# Patient Record
Sex: FEMALE | Race: WHITE | NOT HISPANIC OR LATINO | Employment: FULL TIME | URBAN - METROPOLITAN AREA
[De-identification: names, ages, dates, MRNs, and addresses within clinical notes are randomized per-mention and may not be internally consistent; named-entity substitution may affect disease eponyms.]

---

## 2023-05-23 ENCOUNTER — OFFICE VISIT (OUTPATIENT)
Dept: PRIMARY CARE | Facility: CLINIC | Age: 63
End: 2023-05-23
Payer: COMMERCIAL

## 2023-05-23 VITALS
SYSTOLIC BLOOD PRESSURE: 120 MMHG | WEIGHT: 145 LBS | OXYGEN SATURATION: 99 % | HEART RATE: 73 BPM | TEMPERATURE: 97.2 F | DIASTOLIC BLOOD PRESSURE: 86 MMHG | BODY MASS INDEX: 24.57 KG/M2

## 2023-05-23 DIAGNOSIS — M79.674 GREAT TOE PAIN, RIGHT: Primary | ICD-10-CM

## 2023-05-23 PROCEDURE — 99213 OFFICE O/P EST LOW 20 MIN: CPT | Performed by: FAMILY MEDICINE

## 2023-05-23 PROCEDURE — 1036F TOBACCO NON-USER: CPT | Performed by: FAMILY MEDICINE

## 2023-05-23 RX ORDER — IBUPROFEN 800 MG/1
800 TABLET ORAL 3 TIMES DAILY
Qty: 21 TABLET | Refills: 0 | Status: SHIPPED | OUTPATIENT
Start: 2023-05-23 | End: 2023-07-22

## 2023-05-23 RX ORDER — OMEPRAZOLE 40 MG/1
CAPSULE, DELAYED RELEASE ORAL
COMMUNITY
Start: 2017-09-18 | End: 2023-06-20 | Stop reason: SDUPTHER

## 2023-05-23 RX ORDER — ESTRADIOL 10 UG/1
INSERT VAGINAL
COMMUNITY
Start: 2022-06-03 | End: 2023-06-20 | Stop reason: SDUPTHER

## 2023-05-23 RX ORDER — BUPROPION HCL 300 MG
TABLET, EXTENDED RELEASE 24 HR ORAL
COMMUNITY
Start: 2016-12-13 | End: 2023-06-20 | Stop reason: SDUPTHER

## 2023-05-23 RX ORDER — UMECLIDINIUM 62.5 UG/1
AEROSOL, POWDER ORAL
COMMUNITY
Start: 2022-12-21 | End: 2023-06-20 | Stop reason: SDUPTHER

## 2023-05-23 RX ORDER — FERROUS SULFATE 325(65) MG
TABLET ORAL
COMMUNITY

## 2023-05-23 RX ORDER — TRAZODONE HYDROCHLORIDE 50 MG/1
TABLET ORAL
COMMUNITY
Start: 2013-01-15 | End: 2023-06-20 | Stop reason: SDUPTHER

## 2023-05-23 RX ORDER — ALBUTEROL SULFATE 90 UG/1
AEROSOL, METERED RESPIRATORY (INHALATION)
COMMUNITY
Start: 2021-08-04

## 2023-05-23 NOTE — PROGRESS NOTES
Subjective   Patient ID: Cami Jamison is a 62 y.o. female who presents for swelling/pain in right foot, nki (X months).  HPI    Patient presents because she has been having pain in her right first toe weaning down to the back of her foot for a number of months now.  Worse when she walks around.  Sometimes gets swollen but at times it hurts without being swollen.  No fall or trauma that she is aware of.  Has not tried anti-inflammatories.  Tylenol helps sometimes.    Current Outpatient Medications on File Prior to Visit   Medication Sig Dispense Refill    albuterol 90 mcg/actuation inhaler Inhale.      estradiol (Vagifem) 10 mcg tablet vaginal tablet Insert into the vagina.      ferrous sulfate 325 (65 Fe) MG tablet Take by mouth.      Incruse Ellipta 62.5 mcg/actuation inhalation Inhale.      omeprazole (PriLOSEC) 40 mg DR capsule Take by mouth.      traZODone (Desyrel) 50 mg tablet Take by mouth.      Wellbutrin  mg 24 hr tablet Take by mouth.       No current facility-administered medications on file prior to visit.        Vitals:    05/23/23 1401   BP: 120/86   Pulse: 73   Temp: 36.2 °C (97.2 °F)   SpO2: 99%       Review of Systems   All other systems reviewed and are negative.      Objective     Physical Exam  Musculoskeletal:      Comments: Tenderness to palpation at base of R toe on plantar surface         No visits with results within 2 Month(s) from this visit.   Latest known visit with results is:   Legacy Encounter on 10/12/2022   Component Date Value Ref Range Status    Glucose 10/12/2022 84  74 - 99 mg/dL Final    Sodium 10/12/2022 142  136 - 145 mmol/L Final    Potassium 10/12/2022 4.0  3.5 - 5.3 mmol/L Final    Chloride 10/12/2022 102  98 - 107 mmol/L Final    Bicarbonate 10/12/2022 32  21 - 32 mmol/L Final    Anion Gap 10/12/2022 12  10 - 20 mmol/L Final    Urea Nitrogen 10/12/2022 15  6 - 23 mg/dL Final    Creatinine 10/12/2022 0.84  0.50 - 1.05 mg/dL Final    GFR Female 10/12/2022 78   >90 mL/min/1.73m2 Final    Comment:  CALCULATIONS OF ESTIMATED GFR ARE PERFORMED   USING THE 2021 CKD-EPI STUDY REFIT EQUATION   WITHOUT THE RACE VARIABLE FOR THE IDMS-TRACEABLE   CREATININE METHODS.    https://jasn.asnjournals.org/content/early/2021/09/22/ASN.7684562481      Calcium 10/12/2022 9.6  8.6 - 10.3 mg/dL Final    Albumin 10/12/2022 4.5  3.4 - 5.0 g/dL Final    Alkaline Phosphatase 10/12/2022 48  33 - 136 U/L Final    Total Protein 10/12/2022 6.4  6.4 - 8.2 g/dL Final    AST 10/12/2022 16  9 - 39 U/L Final    Total Bilirubin 10/12/2022 0.4  0.0 - 1.2 mg/dL Final    ALT (SGPT) 10/12/2022 16  7 - 45 U/L Final    Comment:  Patients treated with Sulfasalazine may generate    falsely decreased results for ALT.      Lipase 10/12/2022 26  9 - 82 U/L Final    Comment:  Venipuncture immediately after or during the    administration of Metamizole may lead to falsely   low results. Testing should be performed immediately   prior to Metamizole dosing.      WBC 10/12/2022 7.2  4.4 - 11.3 x10E9/L Final    RBC 10/12/2022 3.87 (L)  4.00 - 5.20 x10E12/L Final    Hemoglobin 10/12/2022 12.4  12.0 - 16.0 g/dL Final    Hematocrit 10/12/2022 37.5  36.0 - 46.0 % Final    MCV 10/12/2022 97  80 - 100 fL Final    MCHC 10/12/2022 33.1  32.0 - 36.0 g/dL Final    Platelets 10/12/2022 263  150 - 450 x10E9/L Final    RDW 10/12/2022 12.5  11.5 - 14.5 % Final    Neutrophils % 10/12/2022 48.4  40.0 - 80.0 % Final    Immature Granulocytes %, Automated 10/12/2022 0.1  0.0 - 0.9 % Final    Comment:  Immature Granulocyte Count (IG) includes promyelocytes,    myelocytes and metamyelocytes but does not include bands.   Percent differential counts (%) should be interpreted in the   context of the absolute cell counts (cells/L).      Lymphocytes % 10/12/2022 36.3  13.0 - 44.0 % Final    Monocytes % 10/12/2022 9.9  2.0 - 10.0 % Final    Eosinophils % 10/12/2022 4.6  0.0 - 6.0 % Final    Basophils % 10/12/2022 0.7  0.0 - 2.0 % Final    Neutrophils  Absolute 10/12/2022 3.46  1.20 - 7.70 x10E9/L Final    Lymphocytes Absolute 10/12/2022 2.60  1.20 - 4.80 x10E9/L Final    Monocytes Absolute 10/12/2022 0.71  0.10 - 1.00 x10E9/L Final    Eosinophils Absolute 10/12/2022 0.33  0.00 - 0.70 x10E9/L Final    Basophils Absolute 10/12/2022 0.05  0.00 - 0.10 x10E9/L Final       Assessment/Plan   Problem List Items Addressed This Visit    None  Visit Diagnoses       Great toe pain, right    -  Primary    Relevant Medications    ibuprofen 800 mg tablet    Other Relevant Orders    XR foot right 1-2 views    Referral to Podiatry          Ordered xray of R foot.  Ref to podiatry.  Try scheduled NSAIDs.

## 2023-05-25 ENCOUNTER — TELEPHONE (OUTPATIENT)
Dept: PRIMARY CARE | Facility: CLINIC | Age: 63
End: 2023-05-25
Payer: COMMERCIAL

## 2023-06-06 ENCOUNTER — TELEPHONE (OUTPATIENT)
Dept: PRIMARY CARE | Facility: CLINIC | Age: 63
End: 2023-06-06
Payer: COMMERCIAL

## 2023-06-06 NOTE — TELEPHONE ENCOUNTER
Spoke to pt and informed her as of now... she can pay her bill via mail in check if she receives and invoice. But I am unsure how long that will last. Pt verbalized understanding

## 2023-06-06 NOTE — TELEPHONE ENCOUNTER
Pt called my vm on 6/5 at 359p requesting a cb regarding cashless and how she is going to pay her copay moving forward, because she has no credit, only cash and checks.    Called pt at 844a on 6/6 - lmom to cb

## 2023-06-14 ENCOUNTER — APPOINTMENT (OUTPATIENT)
Dept: PRIMARY CARE | Facility: CLINIC | Age: 63
End: 2023-06-14
Payer: COMMERCIAL

## 2023-06-20 ENCOUNTER — OFFICE VISIT (OUTPATIENT)
Dept: PRIMARY CARE | Facility: CLINIC | Age: 63
End: 2023-06-20
Payer: COMMERCIAL

## 2023-06-20 VITALS
DIASTOLIC BLOOD PRESSURE: 62 MMHG | WEIGHT: 146 LBS | HEART RATE: 69 BPM | SYSTOLIC BLOOD PRESSURE: 100 MMHG | HEIGHT: 65 IN | TEMPERATURE: 97.8 F | BODY MASS INDEX: 24.32 KG/M2 | OXYGEN SATURATION: 97 %

## 2023-06-20 DIAGNOSIS — Z00.00 ROUTINE ADULT HEALTH MAINTENANCE: Primary | ICD-10-CM

## 2023-06-20 DIAGNOSIS — F32.A DEPRESSIVE DISORDER: ICD-10-CM

## 2023-06-20 DIAGNOSIS — J44.9 CHRONIC OBSTRUCTIVE PULMONARY DISEASE, UNSPECIFIED COPD TYPE (MULTI): ICD-10-CM

## 2023-06-20 DIAGNOSIS — N89.8 VAGINAL DRYNESS: ICD-10-CM

## 2023-06-20 DIAGNOSIS — K21.9 GASTROESOPHAGEAL REFLUX DISEASE WITHOUT ESOPHAGITIS: ICD-10-CM

## 2023-06-20 DIAGNOSIS — J45.20 MILD INTERMITTENT ASTHMA WITHOUT STATUS ASTHMATICUS WITHOUT COMPLICATION (HHS-HCC): ICD-10-CM

## 2023-06-20 DIAGNOSIS — I10 ESSENTIAL HYPERTENSION: ICD-10-CM

## 2023-06-20 DIAGNOSIS — K21.00 GASTROESOPHAGEAL REFLUX DISEASE WITH ESOPHAGITIS WITHOUT HEMORRHAGE: Primary | ICD-10-CM

## 2023-06-20 PROBLEM — J45.909 ASTHMA WITHOUT STATUS ASTHMATICUS (HHS-HCC): Status: ACTIVE | Noted: 2023-06-20

## 2023-06-20 PROBLEM — J30.2 SEASONAL ALLERGIES: Status: ACTIVE | Noted: 2023-06-20

## 2023-06-20 PROCEDURE — 3074F SYST BP LT 130 MM HG: CPT | Performed by: FAMILY MEDICINE

## 2023-06-20 PROCEDURE — 99396 PREV VISIT EST AGE 40-64: CPT | Performed by: FAMILY MEDICINE

## 2023-06-20 PROCEDURE — 3078F DIAST BP <80 MM HG: CPT | Performed by: FAMILY MEDICINE

## 2023-06-20 PROCEDURE — 1036F TOBACCO NON-USER: CPT | Performed by: FAMILY MEDICINE

## 2023-06-20 RX ORDER — ESTRADIOL 10 UG/1
10 INSERT VAGINAL DAILY
Qty: 90 TABLET | Refills: 3 | Status: SHIPPED | OUTPATIENT
Start: 2023-06-20 | End: 2023-06-20

## 2023-06-20 RX ORDER — BUPROPION HCL 300 MG
300 TABLET, EXTENDED RELEASE 24 HR ORAL EVERY MORNING
Qty: 90 TABLET | Refills: 1 | Status: SHIPPED | OUTPATIENT
Start: 2023-06-20 | End: 2023-06-20

## 2023-06-20 RX ORDER — TRAZODONE HYDROCHLORIDE 50 MG/1
50 TABLET ORAL NIGHTLY
Qty: 90 TABLET | Refills: 3 | Status: SHIPPED | OUTPATIENT
Start: 2023-06-20 | End: 2023-06-20

## 2023-06-20 RX ORDER — UMECLIDINIUM 62.5 UG/1
1 AEROSOL, POWDER ORAL DAILY
Qty: 90 EACH | Refills: 3 | Status: SHIPPED | OUTPATIENT
Start: 2023-06-20 | End: 2023-06-20

## 2023-06-20 NOTE — PROGRESS NOTES
"Subjective   Patient ID: Cami Jamison is a 62 y.o. female who presents for Annual Exam and Rash (Red itching area on L forearm x\"weeks\".).  HPI    Current Outpatient Medications on File Prior to Visit   Medication Sig Dispense Refill    albuterol 90 mcg/actuation inhaler Inhale.      estradiol (Vagifem) 10 mcg tablet vaginal tablet Insert into the vagina.      ferrous sulfate 325 (65 Fe) MG tablet Take by mouth.      Incruse Ellipta 62.5 mcg/actuation inhalation Inhale.      omeprazole (PriLOSEC) 40 mg DR capsule Take by mouth.      traZODone (Desyrel) 50 mg tablet Take by mouth.      Wellbutrin  mg 24 hr tablet Take by mouth.      ibuprofen 800 mg tablet Take 1 tablet (800 mg) by mouth 3 times a day. (Patient not taking: Reported on 6/20/2023) 21 tablet 0     No current facility-administered medications on file prior to visit.        Vitals:    06/20/23 1351   BP: 100/62   Pulse: 69   Temp: 36.6 °C (97.8 °F)   SpO2: 97%       Review of Systems   All other systems reviewed and are negative.      Objective     Physical Exam  Vitals reviewed.   Constitutional:       General: She is not in acute distress.     Appearance: Normal appearance. She is well-developed. She is not diaphoretic.   HENT:      Head: Normocephalic and atraumatic.      Right Ear: Tympanic membrane normal.      Left Ear: Tympanic membrane normal.      Nose: Nose normal.      Mouth/Throat:      Mouth: Mucous membranes are moist.   Eyes:      Pupils: Pupils are equal, round, and reactive to light.   Cardiovascular:      Rate and Rhythm: Normal rate and regular rhythm.      Heart sounds: Normal heart sounds. No murmur heard.     No friction rub. No gallop.   Pulmonary:      Effort: Pulmonary effort is normal.      Breath sounds: Normal breath sounds. No rales.   Abdominal:      General: Bowel sounds are normal.      Palpations: Abdomen is soft.      Tenderness: There is no abdominal tenderness.   Musculoskeletal:      Cervical back: Normal " range of motion and neck supple.   Skin:     General: Skin is warm and dry.   Neurological:      Mental Status: She is alert.   Psychiatric:         Mood and Affect: Mood normal.         No visits with results within 2 Month(s) from this visit.   Latest known visit with results is:   Legacy Encounter on 10/12/2022   Component Date Value Ref Range Status    Glucose 10/12/2022 84  74 - 99 mg/dL Final    Sodium 10/12/2022 142  136 - 145 mmol/L Final    Potassium 10/12/2022 4.0  3.5 - 5.3 mmol/L Final    Chloride 10/12/2022 102  98 - 107 mmol/L Final    Bicarbonate 10/12/2022 32  21 - 32 mmol/L Final    Anion Gap 10/12/2022 12  10 - 20 mmol/L Final    Urea Nitrogen 10/12/2022 15  6 - 23 mg/dL Final    Creatinine 10/12/2022 0.84  0.50 - 1.05 mg/dL Final    GFR Female 10/12/2022 78  >90 mL/min/1.73m2 Final    Comment:  CALCULATIONS OF ESTIMATED GFR ARE PERFORMED   USING THE 2021 CKD-EPI STUDY REFIT EQUATION   WITHOUT THE RACE VARIABLE FOR THE IDMS-TRACEABLE   CREATININE METHODS.    https://jasn.asnjournals.org/content/early/2021/09/22/ASN.3749112114      Calcium 10/12/2022 9.6  8.6 - 10.3 mg/dL Final    Albumin 10/12/2022 4.5  3.4 - 5.0 g/dL Final    Alkaline Phosphatase 10/12/2022 48  33 - 136 U/L Final    Total Protein 10/12/2022 6.4  6.4 - 8.2 g/dL Final    AST 10/12/2022 16  9 - 39 U/L Final    Total Bilirubin 10/12/2022 0.4  0.0 - 1.2 mg/dL Final    ALT (SGPT) 10/12/2022 16  7 - 45 U/L Final    Comment:  Patients treated with Sulfasalazine may generate    falsely decreased results for ALT.      Lipase 10/12/2022 26  9 - 82 U/L Final    Comment:  Venipuncture immediately after or during the    administration of Metamizole may lead to falsely   low results. Testing should be performed immediately   prior to Metamizole dosing.      WBC 10/12/2022 7.2  4.4 - 11.3 x10E9/L Final    RBC 10/12/2022 3.87 (L)  4.00 - 5.20 x10E12/L Final    Hemoglobin 10/12/2022 12.4  12.0 - 16.0 g/dL Final    Hematocrit 10/12/2022 37.5  36.0 -  46.0 % Final    MCV 10/12/2022 97  80 - 100 fL Final    MCHC 10/12/2022 33.1  32.0 - 36.0 g/dL Final    Platelets 10/12/2022 263  150 - 450 x10E9/L Final    RDW 10/12/2022 12.5  11.5 - 14.5 % Final    Neutrophils % 10/12/2022 48.4  40.0 - 80.0 % Final    Immature Granulocytes %, Automated 10/12/2022 0.1  0.0 - 0.9 % Final    Comment:  Immature Granulocyte Count (IG) includes promyelocytes,    myelocytes and metamyelocytes but does not include bands.   Percent differential counts (%) should be interpreted in the   context of the absolute cell counts (cells/L).      Lymphocytes % 10/12/2022 36.3  13.0 - 44.0 % Final    Monocytes % 10/12/2022 9.9  2.0 - 10.0 % Final    Eosinophils % 10/12/2022 4.6  0.0 - 6.0 % Final    Basophils % 10/12/2022 0.7  0.0 - 2.0 % Final    Neutrophils Absolute 10/12/2022 3.46  1.20 - 7.70 x10E9/L Final    Lymphocytes Absolute 10/12/2022 2.60  1.20 - 4.80 x10E9/L Final    Monocytes Absolute 10/12/2022 0.71  0.10 - 1.00 x10E9/L Final    Eosinophils Absolute 10/12/2022 0.33  0.00 - 0.70 x10E9/L Final    Basophils Absolute 10/12/2022 0.05  0.00 - 0.10 x10E9/L Final       Assessment/Plan   Problem List Items Addressed This Visit       Asthma without status asthmaticus    Chronic obstructive lung disease (CMS/HCC)    Depressive disorder    Essential hypertension    Gastroesophageal reflux disease     Other Visit Diagnoses       Routine adult health maintenance    -  Primary          Patient is doing well.  Refilled pts meds.  Follow up in 6 mo.  Agree with getting surgery on her foot.

## 2023-06-20 NOTE — TELEPHONE ENCOUNTER
----- Message from Cami Jamison sent at 6/20/2023  5:02 PM EDT -----  Regarding: Omeprazole  Contact: [phone]  could you also put in a refill for my omeprazole please?  Thank You.

## 2023-06-21 RX ORDER — OMEPRAZOLE 40 MG/1
40 CAPSULE, DELAYED RELEASE ORAL
Qty: 90 CAPSULE | Refills: 1 | Status: SHIPPED | OUTPATIENT
Start: 2023-06-21 | End: 2023-06-21

## 2023-10-09 ENCOUNTER — PHARMACY VISIT (OUTPATIENT)
Dept: PHARMACY | Facility: CLINIC | Age: 63
End: 2023-10-09
Payer: COMMERCIAL

## 2023-10-24 ENCOUNTER — OFFICE VISIT (OUTPATIENT)
Dept: PRIMARY CARE | Facility: CLINIC | Age: 63
End: 2023-10-24
Payer: COMMERCIAL

## 2023-10-24 ENCOUNTER — PHARMACY VISIT (OUTPATIENT)
Dept: PHARMACY | Facility: CLINIC | Age: 63
End: 2023-10-24
Payer: COMMERCIAL

## 2023-10-24 VITALS
SYSTOLIC BLOOD PRESSURE: 116 MMHG | OXYGEN SATURATION: 97 % | TEMPERATURE: 97.2 F | DIASTOLIC BLOOD PRESSURE: 70 MMHG | HEART RATE: 76 BPM

## 2023-10-24 DIAGNOSIS — J32.9 SINUSITIS, UNSPECIFIED CHRONICITY, UNSPECIFIED LOCATION: Primary | ICD-10-CM

## 2023-10-24 DIAGNOSIS — R05.9 COUGH, UNSPECIFIED TYPE: ICD-10-CM

## 2023-10-24 PROCEDURE — 3078F DIAST BP <80 MM HG: CPT | Performed by: FAMILY MEDICINE

## 2023-10-24 PROCEDURE — 99214 OFFICE O/P EST MOD 30 MIN: CPT | Performed by: FAMILY MEDICINE

## 2023-10-24 PROCEDURE — 1036F TOBACCO NON-USER: CPT | Performed by: FAMILY MEDICINE

## 2023-10-24 PROCEDURE — RXMED WILLOW AMBULATORY MEDICATION CHARGE

## 2023-10-24 PROCEDURE — 87635 SARS-COV-2 COVID-19 AMP PRB: CPT

## 2023-10-24 PROCEDURE — 3074F SYST BP LT 130 MM HG: CPT | Performed by: FAMILY MEDICINE

## 2023-10-24 RX ORDER — DOXYCYCLINE 100 MG/1
100 CAPSULE ORAL 2 TIMES DAILY
Qty: 20 CAPSULE | Refills: 0 | Status: SHIPPED | OUTPATIENT
Start: 2023-10-24 | End: 2023-11-03

## 2023-10-24 ASSESSMENT — ENCOUNTER SYMPTOMS
COUGH: 1
VOMITING: 0
SORE THROAT: 0
NAUSEA: 0
SLEEP DISTURBANCE: 0
BLOOD IN STOOL: 0
DIZZINESS: 0
SHORTNESS OF BREATH: 0
PALPITATIONS: 0
DIARRHEA: 0
FATIGUE: 1
MYALGIAS: 0
CONSTIPATION: 1
ABDOMINAL PAIN: 0
POLYPHAGIA: 0
POLYDIPSIA: 0
HEADACHES: 0

## 2023-10-24 NOTE — PATIENT INSTRUCTIONS
Start antibiotic    Go to the ER if any of your symptoms are significantly worsening.     Quarantine until your results are received    Return if symptoms persistent.     Return as scheduled, sooner if needed

## 2023-10-24 NOTE — PROGRESS NOTES
Subjective   Patient ID: Cami Jamison is a 62 y.o. female who presents for pt having sinus drainage (Coughing,sneezing, chest congestion x 1 month).    HPI   Cough/sinus: onset x 1 mo. +cough, +sinus pressure left side. Teeth hurt. +Fatigue. No recent abx or travel. 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 No N/V/D. No SOB but occ feels like can't take deep breath.     Review of Systems   Constitutional:  Positive for fatigue.   HENT:  Positive for postnasal drip and sneezing. Negative for sore throat.    Eyes:  Negative for visual disturbance.   Respiratory:  Positive for cough. Negative for shortness of breath.    Cardiovascular:  Negative for chest pain and palpitations.   Gastrointestinal:  Positive for constipation. Negative for abdominal pain, blood in stool, diarrhea, nausea and vomiting.   Endocrine: Negative for cold intolerance, heat intolerance, polydipsia, polyphagia and polyuria.   Musculoskeletal:  Negative for myalgias.   Skin:  Negative for rash.   Allergic/Immunologic: Positive for environmental allergies (fall).   Neurological:  Negative for dizziness and headaches.   Psychiatric/Behavioral:  Negative for sleep disturbance.        Objective   /70   Pulse 76   Temp 36.2 °C (97.2 °F)   SpO2 97%     Physical Exam  Vitals and nursing note reviewed.   Constitutional:       General: She is not in acute distress.     Appearance: Normal appearance. She is not toxic-appearing.   HENT:      Head: Normocephalic.      Right Ear: Tympanic membrane is retracted.      Left Ear: Tympanic membrane is retracted.      Nose: Nose normal.      Mouth/Throat:      Pharynx: Oropharynx is clear.   Eyes:      Pupils: Pupils are equal, round, and reactive to light.   Cardiovascular:      Rate and Rhythm: Normal rate and regular rhythm.      Heart sounds: No murmur heard.  Pulmonary:      Effort: Pulmonary effort is normal. No respiratory distress.      Breath sounds: Normal breath sounds.    Musculoskeletal:         General: No tenderness.   Lymphadenopathy:      Cervical: No cervical adenopathy.   Skin:     General: Skin is warm.   Neurological:      General: No focal deficit present.      Mental Status: She is alert.      Cranial Nerves: No cranial nerve deficit.   Psychiatric:         Mood and Affect: Mood normal.         Assessment/Plan   Problem List Items Addressed This Visit    None  Visit Diagnoses         Codes    Sinusitis, unspecified chronicity, unspecified location    -  Primary J32.9    Relevant Medications    doxycycline (Vibramycin) 100 mg capsule    Other Relevant Orders    Sars-CoV-2 PCR, Symptomatic    Cough, unspecified type     R05.9    Relevant Medications    doxycycline (Vibramycin) 100 mg capsule    Other Relevant Orders    Sars-CoV-2 PCR, Symptomatic        Discussed side effect of med

## 2023-10-25 ENCOUNTER — TELEPHONE (OUTPATIENT)
Dept: PRIMARY CARE | Facility: CLINIC | Age: 63
End: 2023-10-25
Payer: COMMERCIAL

## 2023-10-25 LAB — SARS-COV-2 RNA RESP QL NAA+PROBE: DETECTED

## 2023-10-25 NOTE — TELEPHONE ENCOUNTER
----- Message from Fernanda Hall DO sent at 10/25/2023 10:18 AM EDT -----  Pls tell pt that she does have COVID. Quarantine as discussed. Go to the ER if any of your symptoms are significantly worsening.

## 2023-11-20 ENCOUNTER — OFFICE VISIT (OUTPATIENT)
Dept: OBSTETRICS AND GYNECOLOGY | Facility: CLINIC | Age: 63
End: 2023-11-20
Payer: COMMERCIAL

## 2023-11-20 VITALS
HEIGHT: 64 IN | BODY MASS INDEX: 25.37 KG/M2 | DIASTOLIC BLOOD PRESSURE: 70 MMHG | WEIGHT: 148.6 LBS | SYSTOLIC BLOOD PRESSURE: 100 MMHG

## 2023-11-20 DIAGNOSIS — Z01.419 WELL WOMAN EXAM WITH ROUTINE GYNECOLOGICAL EXAM: Primary | ICD-10-CM

## 2023-11-20 DIAGNOSIS — Z12.31 ENCOUNTER FOR SCREENING MAMMOGRAM FOR MALIGNANT NEOPLASM OF BREAST: ICD-10-CM

## 2023-11-20 DIAGNOSIS — N95.2 ATROPHIC VAGINITIS: ICD-10-CM

## 2023-11-20 DIAGNOSIS — N89.8 VAGINAL DRYNESS: ICD-10-CM

## 2023-11-20 PROCEDURE — 99396 PREV VISIT EST AGE 40-64: CPT | Performed by: NURSE PRACTITIONER

## 2023-11-20 PROCEDURE — 1036F TOBACCO NON-USER: CPT | Performed by: NURSE PRACTITIONER

## 2023-11-20 PROCEDURE — 3074F SYST BP LT 130 MM HG: CPT | Performed by: NURSE PRACTITIONER

## 2023-11-20 PROCEDURE — 3078F DIAST BP <80 MM HG: CPT | Performed by: NURSE PRACTITIONER

## 2023-11-20 RX ORDER — ESTRADIOL 10 UG/1
INSERT VAGINAL
Qty: 24 TABLET | Refills: 3 | Status: SHIPPED | OUTPATIENT
Start: 2023-11-20 | End: 2024-06-04 | Stop reason: SDUPTHER

## 2023-11-20 ASSESSMENT — ENCOUNTER SYMPTOMS
EYES NEGATIVE: 1
HEMATOLOGIC/LYMPHATIC NEGATIVE: 1
ENDOCRINE NEGATIVE: 1
ALLERGIC/IMMUNOLOGIC NEGATIVE: 1
NEUROLOGICAL NEGATIVE: 1
PSYCHIATRIC NEGATIVE: 1
MUSCULOSKELETAL NEGATIVE: 1
CONSTITUTIONAL NEGATIVE: 1
RESPIRATORY NEGATIVE: 1
CARDIOVASCULAR NEGATIVE: 1
GASTROINTESTINAL NEGATIVE: 1

## 2023-11-20 NOTE — PROGRESS NOTES
"    Subjective   Cami Jamison is a 63 y.o. female who is here for a routine exam. Periods are absent, pt is menopausal. She denies PMB, pelvic pain or bleeding.      History of abnormal Pap smear: no  Family history of uterine or ovarian cancer: no  Regular self breast exam: no  History of abnormal mammogram: no  Family history of breast cancer: no  Last pap:  WNL HPV negative no hx of dysplasia.     Menstrual History:  OB History          2    Para   2    Term   2            AB        Living             SAB        IAB        Ectopic        Multiple        Live Births                      No LMP recorded. Patient is postmenopausal.         Review of Systems   Constitutional: Negative.    HENT: Negative.     Eyes: Negative.    Respiratory: Negative.     Cardiovascular: Negative.    Gastrointestinal: Negative.    Endocrine: Negative.    Genitourinary: Negative.    Musculoskeletal: Negative.    Skin: Negative.    Allergic/Immunologic: Negative.    Neurological: Negative.    Hematological: Negative.    Psychiatric/Behavioral: Negative.       History reviewed. No pertinent past medical history.   Past Surgical History:   Procedure Laterality Date    OTHER SURGICAL HISTORY  2022    Cholecystectomy    OTHER SURGICAL HISTORY  2022    Colonoscopy    TOE SURGERY Right 2023    cheilectomy    TUBAL LIGATION        Objective   /70   Ht 1.632 m (5' 4.25\")   Wt 67.4 kg (148 lb 9.6 oz)   BMI 25.31 kg/m²     Constitutional; alert with no acute distress.  Well-nourished.      Neck: No asymmetry noted.  Thyroid without enlargement.  No palpable nodules or masses of concern.    Cardiovascular: Heart regular rate and rhythm, normal S1 and S2    Pulmonary: No respiratory distress, lungs clear to auscultate bilaterally    Chest/breast exam: Appearance bilaterally normal, without asymmetry of concern, without skin lesions or nipple discharge.  Palpation of the breast-no palpable masses no " lymphadenopathy of the axilla.    Abdomen: Soft, nontender, no abdominal masses palpated    Genitourinary:  Palpation of the lymph nodes of the groin-no inguinal lymphadenopathy  External genitalia/perianal: Without lesions normal in appearance   Urethra: In appearance without lesions Bladder: non-tender to palpate  Vagina: Without lesions including Bartholin, urethra, Dobbins's glands within normal limits all WNL   Cervix: Normal in appearance without lesions, no cervical motion tenderness to palpation  Uterus: Without enlargement, mobile, nontender to palpate  Bilateral adnexa:  without masses, nontender to palpate    Skin: Normal skin color and pigmentation    Psychiatric: Alert and oriented x3. Affect normal to patient baseline.  Mood: appropriate       Assessment/Plan   Diagnoses and all orders for this visit:  Well woman exam with routine gynecological exam  -     BI mammo bilateral screening tomosynthesis; Future  Encounter for screening mammogram for malignant neoplasm of breast  -     BI mammo bilateral screening tomosynthesis; Future  Atrophic vaginitis  Vaginal dryness  -     estradiol (Vagifem) 10 mcg tablet vaginal tablet; insert 1 TABLET vaginally ONCE DAILY  Controlled well with vaginal estrogen     Follow up in about 1 year (around 11/20/2024) for annual .     Pap plan: WNL HX, plan co-testing due in 5 years plan to do at age 64, next year.   Discussed DEXA next at age 64  Up to date with colonoscopy. WNL per pt.   RTO 1 year annual exam, prn   Mammogram screening evaluation     Lizzeth Mendoza, APRN-CNM, APRN-CNP

## 2023-12-10 DIAGNOSIS — K21.00 GASTROESOPHAGEAL REFLUX DISEASE WITH ESOPHAGITIS WITHOUT HEMORRHAGE: ICD-10-CM

## 2023-12-10 DIAGNOSIS — F32.A DEPRESSIVE DISORDER: ICD-10-CM

## 2023-12-10 RX ORDER — BUPROPION HYDROCHLORIDE 300 MG/1
300 TABLET ORAL
Qty: 90 TABLET | Refills: 1 | Status: CANCELLED | OUTPATIENT
Start: 2023-12-10 | End: 2024-12-09

## 2023-12-10 RX ORDER — OMEPRAZOLE 40 MG/1
40 CAPSULE, DELAYED RELEASE ORAL
Qty: 90 CAPSULE | Refills: 1 | Status: CANCELLED | OUTPATIENT
Start: 2023-12-10 | End: 2024-12-09

## 2023-12-11 RX ORDER — OMEPRAZOLE 40 MG/1
40 CAPSULE, DELAYED RELEASE ORAL
Qty: 90 CAPSULE | Refills: 1 | OUTPATIENT
Start: 2023-12-11 | End: 2024-12-10

## 2023-12-11 RX ORDER — BUPROPION HYDROCHLORIDE 300 MG/1
300 TABLET ORAL
Qty: 90 TABLET | Refills: 1 | OUTPATIENT
Start: 2023-12-11 | End: 2024-12-10

## 2023-12-12 ENCOUNTER — OFFICE VISIT (OUTPATIENT)
Dept: PRIMARY CARE | Facility: CLINIC | Age: 63
End: 2023-12-12
Payer: COMMERCIAL

## 2023-12-12 VITALS
HEART RATE: 72 BPM | TEMPERATURE: 98.3 F | OXYGEN SATURATION: 96 % | DIASTOLIC BLOOD PRESSURE: 70 MMHG | SYSTOLIC BLOOD PRESSURE: 122 MMHG

## 2023-12-12 DIAGNOSIS — K21.00 GASTROESOPHAGEAL REFLUX DISEASE WITH ESOPHAGITIS WITHOUT HEMORRHAGE: ICD-10-CM

## 2023-12-12 DIAGNOSIS — R05.3 CHRONIC COUGH: Primary | ICD-10-CM

## 2023-12-12 DIAGNOSIS — F32.A DEPRESSIVE DISORDER: ICD-10-CM

## 2023-12-12 DIAGNOSIS — J45.20 MILD INTERMITTENT ASTHMA WITHOUT STATUS ASTHMATICUS WITHOUT COMPLICATION (HHS-HCC): ICD-10-CM

## 2023-12-12 DIAGNOSIS — I10 ESSENTIAL HYPERTENSION: ICD-10-CM

## 2023-12-12 PROCEDURE — 99214 OFFICE O/P EST MOD 30 MIN: CPT | Performed by: FAMILY MEDICINE

## 2023-12-12 PROCEDURE — 3078F DIAST BP <80 MM HG: CPT | Performed by: FAMILY MEDICINE

## 2023-12-12 PROCEDURE — RXMED WILLOW AMBULATORY MEDICATION CHARGE

## 2023-12-12 PROCEDURE — 3074F SYST BP LT 130 MM HG: CPT | Performed by: FAMILY MEDICINE

## 2023-12-12 PROCEDURE — 1036F TOBACCO NON-USER: CPT | Performed by: FAMILY MEDICINE

## 2023-12-12 RX ORDER — PREDNISONE 10 MG/1
TABLET ORAL
Qty: 63 TABLET | Refills: 0 | Status: SHIPPED | OUTPATIENT
Start: 2023-12-12 | End: 2024-01-01

## 2023-12-12 RX ORDER — BUPROPION HYDROCHLORIDE 300 MG/1
300 TABLET ORAL
Qty: 90 TABLET | Refills: 1 | Status: SHIPPED | OUTPATIENT
Start: 2023-12-12 | End: 2024-06-04 | Stop reason: SDUPTHER

## 2023-12-12 RX ORDER — TRAZODONE HYDROCHLORIDE 50 MG/1
50 TABLET ORAL NIGHTLY
Qty: 90 TABLET | Refills: 3 | Status: SHIPPED | OUTPATIENT
Start: 2023-12-12 | End: 2024-06-04 | Stop reason: SDUPTHER

## 2023-12-12 RX ORDER — OMEPRAZOLE 40 MG/1
40 CAPSULE, DELAYED RELEASE ORAL
Qty: 90 CAPSULE | Refills: 1 | Status: SHIPPED | OUTPATIENT
Start: 2023-12-12 | End: 2024-06-04 | Stop reason: SDUPTHER

## 2023-12-12 RX ORDER — UMECLIDINIUM 62.5 UG/1
1 AEROSOL, POWDER ORAL DAILY
Qty: 90 EACH | Refills: 3 | Status: SHIPPED | OUTPATIENT
Start: 2023-12-12 | End: 2024-06-04 | Stop reason: SDUPTHER

## 2023-12-12 ASSESSMENT — ENCOUNTER SYMPTOMS
HYPERTENSION: 1
DEPRESSION: 1

## 2023-12-12 NOTE — PROGRESS NOTES
Subjective   Patient ID: Caim Jamison is a 63 y.o. female who presents for Asthma, Depression, and Hypertension (Recheck /Also  having cough,sinus congestion x 2 months).  Asthma  Her past medical history is significant for asthma.   Depression  Patient has a history of: asthma    Hypertension        Chronic cough:  Has been dealing with a persistent cough and sinus issues since she had COVID a month and a half ago.  2.  COPD:  feeling somewhat winded at times.  3. Depression:  Mood is good currently.      Patient Active Problem List   Diagnosis    Asthma without status asthmaticus    Chronic obstructive lung disease (CMS/[facility])    Depressive disorder    Essential hypertension    Gastroesophageal reflux disease    Insomnia    Seasonal allergies       Social Connections: Not on file       Current Outpatient Medications on File Prior to Visit   Medication Sig Dispense Refill    albuterol 90 mcg/actuation inhaler Inhale.      buPROPion XL (Wellbutrin XL) 300 mg 24 hr tablet TAKE 1 TABLET BY MOUTH EVERY MORNING 90 tablet 1    estradiol (Vagifem) 10 mcg tablet vaginal tablet insert 1 TABLET vaginally ONCE DAILY 90 tablet 3    ferrous sulfate 325 (65 Fe) MG tablet Take by mouth.      Incruse Ellipta 62.5 mcg/actuation inhalation INHALE 1 PUFF BY MOUTH ONCE DAILY 90 each 3    omeprazole (PriLOSEC) 40 mg DR capsule TAKE 1 CAPSULE BY MOUTH ONCE DAILY IN THE MORNING BEFORE A MEAL 90 capsule 1    traZODone (Desyrel) 50 mg tablet TAKE 1 TABLET BY MOUTH ONCE DAILY AT BEDTIME 90 tablet 3    triamcinolone (Kenalog) 0.1 % cream APPLY TO AFFECTED AREA(S) TWO TIMES A DAY FOR 2 WEEKS 80 g 1     No current facility-administered medications on file prior to visit.        Vitals:    12/12/23 1323   BP: 122/70   Pulse: 72   Temp: 36.8 °C (98.3 °F)   SpO2: 96%     There were no vitals filed for this visit.    Review of Systems   Psychiatric/Behavioral:  Positive for depression.    All other systems reviewed and are  negative.      Objective     Physical Exam  Vitals reviewed.   Constitutional:       General: She is not in acute distress.     Appearance: Normal appearance. She is well-developed. She is not diaphoretic.   HENT:      Head: Normocephalic and atraumatic.      Right Ear: Tympanic membrane normal.      Left Ear: Tympanic membrane normal.      Nose: Nose normal.      Mouth/Throat:      Mouth: Mucous membranes are moist.   Eyes:      Pupils: Pupils are equal, round, and reactive to light.   Cardiovascular:      Rate and Rhythm: Normal rate and regular rhythm.      Heart sounds: Normal heart sounds. No murmur heard.     No friction rub. No gallop.   Pulmonary:      Effort: Pulmonary effort is normal.      Breath sounds: Decreased air movement present. No rales.   Abdominal:      General: Bowel sounds are normal.      Palpations: Abdomen is soft.      Tenderness: There is no abdominal tenderness.   Musculoskeletal:      Cervical back: Normal range of motion and neck supple.   Skin:     General: Skin is warm and dry.   Neurological:      Mental Status: She is alert.   Psychiatric:         Mood and Affect: Mood normal.         Office Visit on 10/24/2023   Component Date Value Ref Range Status    Coronavirus 2019, PCR 10/24/2023 Detected (A)  Not Detected Final       Assessment/Plan   Problem List Items Addressed This Visit             ICD-10-CM    Asthma without status asthmaticus J45.909    Relevant Medications    Incruse Ellipta 62.5 mcg/actuation inhalation    predniSONE (Deltasone) 10 mg tablet    Depressive disorder F32.A    Relevant Medications    traZODone (Desyrel) 50 mg tablet    buPROPion XL (Wellbutrin XL) 300 mg 24 hr tablet    Essential hypertension I10    Relevant Orders    Lipid Panel    Comprehensive Metabolic Panel    Gastroesophageal reflux disease K21.9    Relevant Medications    omeprazole (PriLOSEC) 40 mg DR capsule     Other Visit Diagnoses         Codes    Chronic cough    -  Primary R05.3    Relevant  Medications    predniSONE (Deltasone) 10 mg tablet    Other Relevant Orders    XR chest 2 views          Trying prednisone burst and taper.  Could be long COVID.  Refilled pts other meds.  If cough not improving will get xray.  Filiberto in 6 mo.  Talked to pt about her raccoon issue that she has in her yard.

## 2023-12-14 ENCOUNTER — PHARMACY VISIT (OUTPATIENT)
Dept: PHARMACY | Facility: CLINIC | Age: 63
End: 2023-12-14
Payer: COMMERCIAL

## 2023-12-14 ENCOUNTER — HOSPITAL ENCOUNTER (OUTPATIENT)
Dept: RADIOLOGY | Facility: HOSPITAL | Age: 63
Discharge: HOME | End: 2023-12-14
Payer: COMMERCIAL

## 2023-12-14 DIAGNOSIS — R05.3 CHRONIC COUGH: ICD-10-CM

## 2023-12-14 PROCEDURE — RXMED WILLOW AMBULATORY MEDICATION CHARGE

## 2023-12-14 PROCEDURE — 71046 X-RAY EXAM CHEST 2 VIEWS: CPT | Mod: FY

## 2023-12-14 PROCEDURE — 71046 X-RAY EXAM CHEST 2 VIEWS: CPT | Performed by: STUDENT IN AN ORGANIZED HEALTH CARE EDUCATION/TRAINING PROGRAM

## 2023-12-18 ENCOUNTER — TELEPHONE (OUTPATIENT)
Dept: PRIMARY CARE | Facility: CLINIC | Age: 63
End: 2023-12-18
Payer: COMMERCIAL

## 2023-12-18 NOTE — TELEPHONE ENCOUNTER
----- Message from Rashaad Khalil MD sent at 12/16/2023  9:46 PM EST -----  Regarding: FW:  Please let pt know her cxr was normal although it looks like she has a hiatal hernia.  ----- Message -----  From: Interface, Radiology Results In  Sent: 12/15/2023  11:59 PM EST  To: Rashaad Khalil MD

## 2023-12-20 ENCOUNTER — HOSPITAL ENCOUNTER (OUTPATIENT)
Dept: RADIOLOGY | Facility: HOSPITAL | Age: 63
Discharge: HOME | End: 2023-12-20
Payer: COMMERCIAL

## 2023-12-20 DIAGNOSIS — Z01.419 WELL WOMAN EXAM WITH ROUTINE GYNECOLOGICAL EXAM: ICD-10-CM

## 2023-12-20 DIAGNOSIS — Z12.31 ENCOUNTER FOR SCREENING MAMMOGRAM FOR MALIGNANT NEOPLASM OF BREAST: ICD-10-CM

## 2023-12-20 PROCEDURE — 77067 SCR MAMMO BI INCL CAD: CPT | Performed by: RADIOLOGY

## 2023-12-20 PROCEDURE — 77067 SCR MAMMO BI INCL CAD: CPT

## 2023-12-20 PROCEDURE — 77063 BREAST TOMOSYNTHESIS BI: CPT | Performed by: RADIOLOGY

## 2024-01-25 ENCOUNTER — HOSPITAL ENCOUNTER (EMERGENCY)
Facility: HOSPITAL | Age: 64
Discharge: HOME | End: 2024-01-26
Attending: EMERGENCY MEDICINE
Payer: COMMERCIAL

## 2024-01-25 DIAGNOSIS — Z20.89 MENINGITIS EXPOSURE: Primary | ICD-10-CM

## 2024-01-25 PROCEDURE — 99283 EMERGENCY DEPT VISIT LOW MDM: CPT | Performed by: EMERGENCY MEDICINE

## 2024-01-26 VITALS
HEART RATE: 75 BPM | OXYGEN SATURATION: 98 % | SYSTOLIC BLOOD PRESSURE: 141 MMHG | RESPIRATION RATE: 16 BRPM | DIASTOLIC BLOOD PRESSURE: 77 MMHG

## 2024-01-26 PROCEDURE — 2500000001 HC RX 250 WO HCPCS SELF ADMINISTERED DRUGS (ALT 637 FOR MEDICARE OP): Performed by: EMERGENCY MEDICINE

## 2024-01-26 RX ORDER — CIPROFLOXACIN 500 MG/1
500 TABLET ORAL ONCE
Status: COMPLETED | OUTPATIENT
Start: 2024-01-26 | End: 2024-01-26

## 2024-01-26 RX ADMIN — CIPROFLOXACIN 500 MG: 500 TABLET, FILM COATED ORAL at 00:30

## 2024-01-26 NOTE — ED PROVIDER NOTES
Cami Jamison is a 63 y.o. patient presenting to the ED for exposure to bacterial meningitis.  The patient is a healthcare worker and was in the room with the patient he was later determined to have bacterial meningitis.  The patient has been feeling well recently.  No current headache, fever, or other symptoms.    Additional History Obtained from: none  Limitations to History: none  ------------------------------------------------------------------------------------------------------------------------------------------  Physical Exam:  Appearance: Alert, cooperative, not distressed.  Skin: Warm, dry, appropriate color for ethnicity.  Eyes: Cornea clear. No scleral icterus or injection.   ENT: Mucous membranes moist.  Pulmonary: No accessory muscle use or stridor.   Neurological: Face symmetrical. Voice clear. Appropriately conversant.   Psychiatric: Appropriate mood and affect.    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 None.    Diagnosis: exposure to bacterial meningitis    Escalation of Care:  Patient was treated with 500 mg p.o. Cipro x 1.       Raisa Tavarez DO  01/29/24 1038

## 2024-03-06 ENCOUNTER — TELEPHONE (OUTPATIENT)
Dept: OBSTETRICS AND GYNECOLOGY | Facility: CLINIC | Age: 64
End: 2024-03-06
Payer: COMMERCIAL

## 2024-03-06 PROCEDURE — RXMED WILLOW AMBULATORY MEDICATION CHARGE

## 2024-03-06 NOTE — TELEPHONE ENCOUNTER
PHARMACY CALLED INSTRUCTIONS  FOR ESTRADIOL SAY ONCE DIANA AND THEY DON'T USUALLY DO THAT MUCH. IS IT SUPPOSED TO BE 2 TIMES A WEEK

## 2024-03-21 ENCOUNTER — PHARMACY VISIT (OUTPATIENT)
Dept: PHARMACY | Facility: CLINIC | Age: 64
End: 2024-03-21
Payer: COMMERCIAL

## 2024-05-29 ENCOUNTER — LAB (OUTPATIENT)
Dept: LAB | Facility: LAB | Age: 64
End: 2024-05-29
Payer: COMMERCIAL

## 2024-05-29 DIAGNOSIS — Z00.00 ROUTINE ADULT HEALTH MAINTENANCE: ICD-10-CM

## 2024-05-29 DIAGNOSIS — I10 ESSENTIAL HYPERTENSION: ICD-10-CM

## 2024-05-29 LAB
ALBUMIN SERPL BCP-MCNC: 4.4 G/DL (ref 3.4–5)
ALP SERPL-CCNC: 60 U/L (ref 33–136)
ALT SERPL W P-5'-P-CCNC: 20 U/L (ref 7–45)
ANION GAP SERPL CALC-SCNC: 11 MMOL/L (ref 10–20)
AST SERPL W P-5'-P-CCNC: 18 U/L (ref 9–39)
BASOPHILS # BLD AUTO: 0.07 X10*3/UL (ref 0–0.1)
BASOPHILS NFR BLD AUTO: 0.9 %
BILIRUB SERPL-MCNC: 0.3 MG/DL (ref 0–1.2)
BUN SERPL-MCNC: 14 MG/DL (ref 6–23)
CALCIUM SERPL-MCNC: 9.6 MG/DL (ref 8.6–10.3)
CHLORIDE SERPL-SCNC: 103 MMOL/L (ref 98–107)
CHOLEST SERPL-MCNC: 205 MG/DL (ref 0–199)
CHOLESTEROL/HDL RATIO: 2.8
CO2 SERPL-SCNC: 31 MMOL/L (ref 21–32)
CREAT SERPL-MCNC: 0.86 MG/DL (ref 0.5–1.05)
EGFRCR SERPLBLD CKD-EPI 2021: 76 ML/MIN/1.73M*2
EOSINOPHIL # BLD AUTO: 0.38 X10*3/UL (ref 0–0.7)
EOSINOPHIL NFR BLD AUTO: 4.7 %
ERYTHROCYTE [DISTWIDTH] IN BLOOD BY AUTOMATED COUNT: 12.5 % (ref 11.5–14.5)
GLUCOSE SERPL-MCNC: 89 MG/DL (ref 74–99)
HCT VFR BLD AUTO: 40 % (ref 36–46)
HDLC SERPL-MCNC: 72.3 MG/DL
HGB BLD-MCNC: 13 G/DL (ref 12–16)
IMM GRANULOCYTES # BLD AUTO: 0.02 X10*3/UL (ref 0–0.7)
IMM GRANULOCYTES NFR BLD AUTO: 0.2 % (ref 0–0.9)
LDLC SERPL CALC-MCNC: 113 MG/DL
LYMPHOCYTES # BLD AUTO: 3.09 X10*3/UL (ref 1.2–4.8)
LYMPHOCYTES NFR BLD AUTO: 38.2 %
MCH RBC QN AUTO: 32.1 PG (ref 26–34)
MCHC RBC AUTO-ENTMCNC: 32.5 G/DL (ref 32–36)
MCV RBC AUTO: 99 FL (ref 80–100)
MONOCYTES # BLD AUTO: 0.81 X10*3/UL (ref 0.1–1)
MONOCYTES NFR BLD AUTO: 10 %
NEUTROPHILS # BLD AUTO: 3.72 X10*3/UL (ref 1.2–7.7)
NEUTROPHILS NFR BLD AUTO: 46 %
NON HDL CHOLESTEROL: 133 MG/DL (ref 0–149)
NRBC BLD-RTO: 0 /100 WBCS (ref 0–0)
PLATELET # BLD AUTO: 277 X10*3/UL (ref 150–450)
POTASSIUM SERPL-SCNC: 4.1 MMOL/L (ref 3.5–5.3)
PROT SERPL-MCNC: 6.4 G/DL (ref 6.4–8.2)
RBC # BLD AUTO: 4.05 X10*6/UL (ref 4–5.2)
SODIUM SERPL-SCNC: 141 MMOL/L (ref 136–145)
TRIGL SERPL-MCNC: 97 MG/DL (ref 0–149)
VLDL: 19 MG/DL (ref 0–40)
WBC # BLD AUTO: 8.1 X10*3/UL (ref 4.4–11.3)

## 2024-05-29 PROCEDURE — 36415 COLL VENOUS BLD VENIPUNCTURE: CPT

## 2024-05-29 PROCEDURE — 80061 LIPID PANEL: CPT

## 2024-05-29 PROCEDURE — 85025 COMPLETE CBC W/AUTO DIFF WBC: CPT

## 2024-05-29 PROCEDURE — 80053 COMPREHEN METABOLIC PANEL: CPT

## 2024-06-04 ENCOUNTER — OFFICE VISIT (OUTPATIENT)
Dept: PRIMARY CARE | Facility: CLINIC | Age: 64
End: 2024-06-04
Payer: COMMERCIAL

## 2024-06-04 VITALS
HEART RATE: 67 BPM | SYSTOLIC BLOOD PRESSURE: 124 MMHG | DIASTOLIC BLOOD PRESSURE: 76 MMHG | OXYGEN SATURATION: 97 % | BODY MASS INDEX: 24.53 KG/M2 | TEMPERATURE: 97.9 F | WEIGHT: 144 LBS

## 2024-06-04 DIAGNOSIS — J45.20 MILD INTERMITTENT ASTHMA WITHOUT STATUS ASTHMATICUS WITHOUT COMPLICATION (HHS-HCC): ICD-10-CM

## 2024-06-04 DIAGNOSIS — K21.00 GASTROESOPHAGEAL REFLUX DISEASE WITH ESOPHAGITIS WITHOUT HEMORRHAGE: ICD-10-CM

## 2024-06-04 DIAGNOSIS — N89.8 VAGINAL DRYNESS: ICD-10-CM

## 2024-06-04 DIAGNOSIS — F32.A DEPRESSIVE DISORDER: ICD-10-CM

## 2024-06-04 PROBLEM — R05.3 CHRONIC COUGH: Status: ACTIVE | Noted: 2024-06-04

## 2024-06-04 PROBLEM — N94.89 MASS OF UTERINE ADNEXA: Status: ACTIVE | Noted: 2024-06-04

## 2024-06-04 PROCEDURE — 3078F DIAST BP <80 MM HG: CPT | Performed by: FAMILY MEDICINE

## 2024-06-04 PROCEDURE — 99214 OFFICE O/P EST MOD 30 MIN: CPT | Performed by: FAMILY MEDICINE

## 2024-06-04 PROCEDURE — 3074F SYST BP LT 130 MM HG: CPT | Performed by: FAMILY MEDICINE

## 2024-06-04 PROCEDURE — RXMED WILLOW AMBULATORY MEDICATION CHARGE

## 2024-06-04 PROCEDURE — 1036F TOBACCO NON-USER: CPT | Performed by: FAMILY MEDICINE

## 2024-06-04 RX ORDER — UMECLIDINIUM 62.5 UG/1
1 AEROSOL, POWDER ORAL DAILY
Qty: 90 EACH | Refills: 3 | Status: SHIPPED | OUTPATIENT
Start: 2024-06-04 | End: 2024-06-04 | Stop reason: ALTCHOICE

## 2024-06-04 RX ORDER — UMECLIDINIUM 62.5 UG/1
1 AEROSOL, POWDER ORAL DAILY
Qty: 90 EACH | Refills: 3 | Status: SHIPPED | OUTPATIENT
Start: 2024-06-04 | End: 2025-06-04

## 2024-06-04 RX ORDER — OMEPRAZOLE 40 MG/1
40 CAPSULE, DELAYED RELEASE ORAL
Qty: 90 CAPSULE | Refills: 1 | Status: SHIPPED | OUTPATIENT
Start: 2024-06-04 | End: 2024-12-01

## 2024-06-04 RX ORDER — ESTRADIOL 10 UG/1
INSERT VAGINAL
Qty: 24 TABLET | Refills: 3 | Status: SHIPPED | OUTPATIENT
Start: 2024-06-04 | End: 2025-06-04

## 2024-06-04 RX ORDER — TRAZODONE HYDROCHLORIDE 50 MG/1
50 TABLET ORAL NIGHTLY
Qty: 90 TABLET | Refills: 3 | Status: SHIPPED | OUTPATIENT
Start: 2024-06-04 | End: 2025-06-04

## 2024-06-04 RX ORDER — BUPROPION HYDROCHLORIDE 300 MG/1
300 TABLET ORAL
Qty: 90 TABLET | Refills: 1 | Status: SHIPPED | OUTPATIENT
Start: 2024-06-04 | End: 2024-12-01

## 2024-06-04 ASSESSMENT — ENCOUNTER SYMPTOMS
DEPRESSION: 1
HYPERTENSION: 1

## 2024-06-04 NOTE — PROGRESS NOTES
Subjective   Patient ID: Cami Jamison is a 63 y.o. female who presents for GERD and Hypertension.  Asthma  Her past medical history is significant for asthma.   Depression  Patient has a history of: asthma    Hypertension      Chronic cough:  was better in [place]    2.  COPD:  feeling somewhat winded at times.  Not taking Encrus    3. Depression:  Mood is fair.  Working 3rd shift.      Patient Active Problem List   Diagnosis    Asthma without status asthmaticus ([facility])    Chronic obstructive lung disease (Multi)    Depressive disorder    Essential hypertension    Gastroesophageal reflux disease    Insomnia    Seasonal allergies    Chronic cough    Mass of uterine adnexa       Social Connections: Not on file       Current Outpatient Medications on File Prior to Visit   Medication Sig Dispense Refill    ferrous sulfate 325 (65 Fe) MG tablet Take by mouth.      triamcinolone (Kenalog) 0.1 % cream APPLY TO AFFECTED AREA(S) TWO TIMES A DAY FOR 2 WEEKS 80 g 1    [DISCONTINUED] buPROPion XL (Wellbutrin XL) 300 mg 24 hr tablet TAKE 1 TABLET BY MOUTH EVERY MORNING 90 tablet 1    [DISCONTINUED] estradiol (Vagifem) 10 mcg tablet vaginal tablet insert 1 TABLET vaginally twice weekly at night 24 tablet 3    [DISCONTINUED] Incruse Ellipta 62.5 mcg/actuation inhalation INHALE 1 PUFF BY MOUTH ONCE DAILY 90 each 3    [DISCONTINUED] omeprazole (PriLOSEC) 40 mg DR capsule TAKE 1 CAPSULE BY MOUTH ONCE DAILY IN THE MORNING BEFORE A MEAL 90 capsule 1    [DISCONTINUED] traZODone (Desyrel) 50 mg tablet TAKE 1 TABLET BY MOUTH ONCE DAILY AT BEDTIME 90 tablet 3    albuterol 90 mcg/actuation inhaler Inhale.       No current facility-administered medications on file prior to visit.        Vitals:    06/04/24 1313   BP: 124/76   Pulse: 67   Temp: 36.6 °C (97.9 °F)   SpO2: 97%     Vitals:    06/04/24 1313   Weight: 65.3 kg (144 lb)       Review of Systems   All other systems reviewed and are negative.      Objective     Physical  Exam  Vitals reviewed.   Constitutional:       General: She is not in acute distress.     Appearance: Normal appearance. She is well-developed. She is not diaphoretic.   HENT:      Head: Normocephalic and atraumatic.      Right Ear: Tympanic membrane normal.      Left Ear: Tympanic membrane normal.      Nose: Nose normal.      Mouth/Throat:      Mouth: Mucous membranes are moist.   Eyes:      Pupils: Pupils are equal, round, and reactive to light.   Cardiovascular:      Rate and Rhythm: Normal rate and regular rhythm.      Heart sounds: Normal heart sounds. No murmur heard.     No friction rub. No gallop.   Pulmonary:      Effort: Pulmonary effort is normal.      Breath sounds: Decreased air movement present. No rales.   Abdominal:      General: Bowel sounds are normal.      Palpations: Abdomen is soft.      Tenderness: There is no abdominal tenderness.   Musculoskeletal:      Cervical back: Normal range of motion and neck supple.   Skin:     General: Skin is warm and dry.   Neurological:      Mental Status: She is alert.   Psychiatric:         Mood and Affect: Mood normal.         Lab on 05/29/2024   Component Date Value Ref Range Status    WBC 05/29/2024 8.1  4.4 - 11.3 x10*3/uL Final    nRBC 05/29/2024 0.0  0.0 - 0.0 /100 WBCs Final    RBC 05/29/2024 4.05  4.00 - 5.20 x10*6/uL Final    Hemoglobin 05/29/2024 13.0  12.0 - 16.0 g/dL Final    Hematocrit 05/29/2024 40.0  36.0 - 46.0 % Final    MCV 05/29/2024 99  80 - 100 fL Final    MCH 05/29/2024 32.1  26.0 - 34.0 pg Final    MCHC 05/29/2024 32.5  32.0 - 36.0 g/dL Final    RDW 05/29/2024 12.5  11.5 - 14.5 % Final    Platelets 05/29/2024 277  150 - 450 x10*3/uL Final    Neutrophils % 05/29/2024 46.0  40.0 - 80.0 % Final    Immature Granulocytes %, Automated 05/29/2024 0.2  0.0 - 0.9 % Final    Immature Granulocyte Count (IG) includes promyelocytes, myelocytes and metamyelocytes but does not include bands. Percent differential counts (%) should be interpreted in the  context of the absolute cell counts (cells/UL).    Lymphocytes % 05/29/2024 38.2  13.0 - 44.0 % Final    Monocytes % 05/29/2024 10.0  2.0 - 10.0 % Final    Eosinophils % 05/29/2024 4.7  0.0 - 6.0 % Final    Basophils % 05/29/2024 0.9  0.0 - 2.0 % Final    Neutrophils Absolute 05/29/2024 3.72  1.20 - 7.70 x10*3/uL Final    Percent differential counts (%) should be interpreted in the context of the absolute cell counts (cells/uL).    Immature Granulocytes Absolute, Au* 05/29/2024 0.02  0.00 - 0.70 x10*3/uL Final    Lymphocytes Absolute 05/29/2024 3.09  1.20 - 4.80 x10*3/uL Final    Monocytes Absolute 05/29/2024 0.81  0.10 - 1.00 x10*3/uL Final    Eosinophils Absolute 05/29/2024 0.38  0.00 - 0.70 x10*3/uL Final    Basophils Absolute 05/29/2024 0.07  0.00 - 0.10 x10*3/uL Final    Glucose 05/29/2024 89  74 - 99 mg/dL Final    Sodium 05/29/2024 141  136 - 145 mmol/L Final    Potassium 05/29/2024 4.1  3.5 - 5.3 mmol/L Final    Chloride 05/29/2024 103  98 - 107 mmol/L Final    Bicarbonate 05/29/2024 31  21 - 32 mmol/L Final    Anion Gap 05/29/2024 11  10 - 20 mmol/L Final    Urea Nitrogen 05/29/2024 14  6 - 23 mg/dL Final    Creatinine 05/29/2024 0.86  0.50 - 1.05 mg/dL Final    eGFR 05/29/2024 76  >60 mL/min/1.73m*2 Final    Calculations of estimated GFR are performed using the 2021 CKD-EPI Study Refit equation without the race variable for the IDMS-Traceable creatinine methods.  https://jasn.asnjournals.org/content/early/2021/09/22/ASN.5149583722    Calcium 05/29/2024 9.6  8.6 - 10.3 mg/dL Final    Albumin 05/29/2024 4.4  3.4 - 5.0 g/dL Final    Alkaline Phosphatase 05/29/2024 60  33 - 136 U/L Final    Total Protein 05/29/2024 6.4  6.4 - 8.2 g/dL Final    AST 05/29/2024 18  9 - 39 U/L Final    Bilirubin, Total 05/29/2024 0.3  0.0 - 1.2 mg/dL Final    ALT 05/29/2024 20  7 - 45 U/L Final    Patients treated with Sulfasalazine may generate falsely decreased results for ALT.    Cholesterol 05/29/2024 205 (H)  0 - 199 mg/dL  Final          Age      Desirable   Borderline High   High     0-19 Y     0 - 169       170 - 199     >/= 200    20-24 Y     0 - 189       190 - 224     >/= 225         >24 Y     0 - 199       200 - 239     >/= 240   **All ranges are based on fasting samples. Specific   therapeutic targets will vary based on patient-specific   cardiac risk.    Pediatric guidelines reference:Pediatrics 2011, 128(S5).Adult guidelines reference: NCEP ATPIII Guidelines,LYNSEY 2001, 258:2486-97    Venipuncture immediately after or during the administration of Metamizole may lead to falsely low results. Testing should be performed immediately prior to Metamizole dosing.    HDL-Cholesterol 05/29/2024 72.3  mg/dL Final      Age       Very Low   Low     Normal    High    0-19 Y    < 35      < 40     40-45     ----  20-24 Y    ----     < 40      >45      ----        >24 Y      ----     < 40     40-60      >60      Cholesterol/HDL Ratio 05/29/2024 2.8   Final      Ref Values  Desirable  < 3.4  High Risk  > 5.0    LDL Calculated 05/29/2024 113 (H)  <=99 mg/dL Final                                Near   Borderline      AGE      Desirable  Optimal    High     High     Very High     0-19 Y     0 - 109     ---    110-129   >/= 130     ----    20-24 Y     0 - 119     ---    120-159   >/= 160     ----      >24 Y     0 -  99   100-129  130-159   160-189     >/=190      VLDL 05/29/2024 19  0 - 40 mg/dL Final    Triglycerides 05/29/2024 97  0 - 149 mg/dL Final       Age         Desirable   Borderline High   High     Very High   0 D-90 D    19 - 174         ----         ----        ----  91 D- 9 Y     0 -  74        75 -  99     >/= 100      ----    10-19 Y     0 -  89        90 - 129     >/= 130      ----    20-24 Y     0 - 114       115 - 149     >/= 150      ----         >24 Y     0 - 149       150 - 199    200- 499    >/= 500    Venipuncture immediately after or during the administration of Metamizole may lead to falsely low results. Testing should be  performed immediately prior to Metamizole dosing.    Non HDL Cholesterol 05/29/2024 133  0 - 149 mg/dL Final          Age       Desirable   Borderline High   High     Very High     0-19 Y     0 - 119       120 - 144     >/= 145    >/= 160    20-24 Y     0 - 149       150 - 189     >/= 190      ----         >24 Y    30 mg/dL above LDL Cholesterol goal         Assessment/Plan   Problem List Items Addressed This Visit             ICD-10-CM    Asthma without status asthmaticus ([facility]) J45.909    Relevant Medications    Incruse Ellipta 62.5 mcg/actuation inhalation    Depressive disorder F32.A    Relevant Medications    buPROPion XL (Wellbutrin XL) 300 mg 24 hr tablet    traZODone (Desyrel) 50 mg tablet    Gastroesophageal reflux disease K21.9    Relevant Medications    omeprazole (PriLOSEC) 40 mg DR capsule     Other Visit Diagnoses         Codes    Vaginal dryness     N89.8    Relevant Medications    estradiol (Vagifem) 10 mcg tablet vaginal tablet          Refilled pts meds.  Encouraged high dose melatonin to use during time when she is working 3rd shift.  Fu in 6 mo

## 2024-06-07 ENCOUNTER — PHARMACY VISIT (OUTPATIENT)
Dept: PHARMACY | Facility: CLINIC | Age: 64
End: 2024-06-07
Payer: COMMERCIAL

## 2024-09-10 PROCEDURE — RXMED WILLOW AMBULATORY MEDICATION CHARGE

## 2024-09-19 PROCEDURE — RXMED WILLOW AMBULATORY MEDICATION CHARGE

## 2024-09-20 ENCOUNTER — PHARMACY VISIT (OUTPATIENT)
Dept: PHARMACY | Facility: CLINIC | Age: 64
End: 2024-09-20
Payer: COMMERCIAL

## 2024-09-20 PROCEDURE — RXMED WILLOW AMBULATORY MEDICATION CHARGE

## 2024-11-26 ENCOUNTER — OFFICE VISIT (OUTPATIENT)
Dept: OBSTETRICS AND GYNECOLOGY | Facility: CLINIC | Age: 64
End: 2024-11-26
Payer: COMMERCIAL

## 2024-11-26 ENCOUNTER — APPOINTMENT (OUTPATIENT)
Dept: OBSTETRICS AND GYNECOLOGY | Facility: CLINIC | Age: 64
End: 2024-11-26
Payer: COMMERCIAL

## 2024-11-26 VITALS
BODY MASS INDEX: 24.59 KG/M2 | HEIGHT: 64 IN | SYSTOLIC BLOOD PRESSURE: 116 MMHG | WEIGHT: 144 LBS | DIASTOLIC BLOOD PRESSURE: 72 MMHG

## 2024-11-26 DIAGNOSIS — Z11.51 SCREENING FOR HPV (HUMAN PAPILLOMAVIRUS): ICD-10-CM

## 2024-11-26 DIAGNOSIS — Z01.419 WELL WOMAN EXAM WITH ROUTINE GYNECOLOGICAL EXAM: Primary | ICD-10-CM

## 2024-11-26 DIAGNOSIS — N89.8 VAGINAL DRYNESS: ICD-10-CM

## 2024-11-26 DIAGNOSIS — N95.2 ATROPHIC VAGINITIS: ICD-10-CM

## 2024-11-26 DIAGNOSIS — Z12.31 SCREENING MAMMOGRAM FOR BREAST CANCER: ICD-10-CM

## 2024-11-26 DIAGNOSIS — Z13.820 ENCOUNTER FOR SCREENING FOR OSTEOPOROSIS: ICD-10-CM

## 2024-11-26 DIAGNOSIS — Z12.4 CERVICAL CANCER SCREENING: ICD-10-CM

## 2024-11-26 PROCEDURE — RXMED WILLOW AMBULATORY MEDICATION CHARGE

## 2024-11-26 PROCEDURE — 1036F TOBACCO NON-USER: CPT | Performed by: NURSE PRACTITIONER

## 2024-11-26 PROCEDURE — 3078F DIAST BP <80 MM HG: CPT | Performed by: NURSE PRACTITIONER

## 2024-11-26 PROCEDURE — 3008F BODY MASS INDEX DOCD: CPT | Performed by: NURSE PRACTITIONER

## 2024-11-26 PROCEDURE — 99397 PER PM REEVAL EST PAT 65+ YR: CPT | Performed by: NURSE PRACTITIONER

## 2024-11-26 PROCEDURE — 1159F MED LIST DOCD IN RCRD: CPT | Performed by: NURSE PRACTITIONER

## 2024-11-26 PROCEDURE — 3074F SYST BP LT 130 MM HG: CPT | Performed by: NURSE PRACTITIONER

## 2024-11-26 PROCEDURE — 88175 CYTOPATH C/V AUTO FLUID REDO: CPT

## 2024-11-26 PROCEDURE — 87624 HPV HI-RISK TYP POOLED RSLT: CPT

## 2024-11-26 RX ORDER — CYANOCOBALAMIN (VITAMIN B-12) 250 MCG
250 TABLET ORAL DAILY
COMMUNITY

## 2024-11-26 RX ORDER — ESTRADIOL 10 UG/1
INSERT VAGINAL
Qty: 24 TABLET | Refills: 3 | Status: SHIPPED | OUTPATIENT
Start: 2024-11-26 | End: 2025-11-26

## 2024-11-26 RX ORDER — COVID-19 VACCINE, MRNA 0.05 MG/.48ML
INJECTION, SUSPENSION INTRAMUSCULAR
COMMUNITY
Start: 2023-12-08

## 2024-11-26 RX ORDER — INFLUENZA A VIRUS A/GEORGIA/12/2022 CVR-167 (H1N1) ANTIGEN (MDCK CELL DERIVED, PROPIOLACTONE INACTIVATED), INFLUENZA A VIRUS A/DARWIN/11/2021 (H3N2) ANTIGEN (MDCK CELL DERIVED, PROPIOLACTONE INACTIVATED),INFLUENZA B VIRUS B/SINGAPORE/WUH4618/2021 ANTIGEN (MDCK CELL DERIVED, PROPIOLACTONE INACTIVATED),INFLUENZA B VIRUS B/SINGAPORE/INFTT-16-0610/2016 ANTIGEN (MDCK CELL DERIVED, PROPIOLACTONE INACTIVATED) 15; 15; 15; 15 UG/.5ML; UG/.5ML; UG/.5ML; UG/.5ML
INJECTION, SUSPENSION INTRAMUSCULAR
COMMUNITY
Start: 2023-12-08

## 2024-11-26 ASSESSMENT — ENCOUNTER SYMPTOMS
GASTROINTESTINAL NEGATIVE: 1
MUSCULOSKELETAL NEGATIVE: 1
ENDOCRINE NEGATIVE: 1
PSYCHIATRIC NEGATIVE: 1
NEUROLOGICAL NEGATIVE: 1
HEMATOLOGIC/LYMPHATIC NEGATIVE: 1
ALLERGIC/IMMUNOLOGIC NEGATIVE: 1
EYES NEGATIVE: 1
RESPIRATORY NEGATIVE: 1
CONSTITUTIONAL NEGATIVE: 1
CARDIOVASCULAR NEGATIVE: 1

## 2024-11-26 NOTE — PROGRESS NOTES
"    Subjective   Cami Jamison is a 64 y.o. female who is here for a routine exam. Periods are absent, pt is menopausal. She denies PMB, pelvic pain or bleeding.      History of abnormal Pap smear: no  Family history of uterine or ovarian cancer: no  Regular self breast exam: no  History of abnormal mammogram: no  Family history of breast cancer: no  Last pap:  WNL HPV negative no hx of dysplasia.     Menstrual History:  OB History          2    Para   2    Term   2            AB        Living             SAB        IAB        Ectopic        Multiple        Live Births                      No LMP recorded. Patient is postmenopausal.         Review of Systems   Constitutional: Negative.    HENT: Negative.     Eyes: Negative.    Respiratory: Negative.     Cardiovascular: Negative.    Gastrointestinal: Negative.    Endocrine: Negative.    Genitourinary: Negative.    Musculoskeletal: Negative.    Skin: Negative.    Allergic/Immunologic: Negative.    Neurological: Negative.    Hematological: Negative.    Psychiatric/Behavioral: Negative.       History reviewed. No pertinent past medical history.   Past Surgical History:   Procedure Laterality Date    OTHER SURGICAL HISTORY  2022    Cholecystectomy    OTHER SURGICAL HISTORY  2022    Colonoscopy    TOE SURGERY Right 2023    cheilectomy    TUBAL LIGATION        Objective   /72   Ht 1.632 m (5' 4.25\")   Wt 65.3 kg (144 lb)   BMI 24.53 kg/m²     Constitutional; alert with no acute distress.  Well-nourished.      Neck: No asymmetry noted.  Thyroid without enlargement.  No palpable nodules or masses of concern.    Cardiovascular: Heart regular rate and rhythm, normal S1 and S2    Pulmonary: No respiratory distress, lungs clear to auscultate bilaterally    Chest/breast exam: Appearance bilaterally normal, without asymmetry of concern, without skin lesions or nipple discharge.  Palpation of the breast-no palpable masses no " lymphadenopathy of the axilla.    Abdomen: Soft, nontender, no abdominal masses palpated    Genitourinary:  Palpation of the lymph nodes of the groin-no inguinal lymphadenopathy  External genitalia/perianal: Without lesions normal in appearance   Urethra: In appearance without lesions Bladder: non-tender to palpate  Vagina: Without lesions including Bartholin, urethra, Barnett's glands within normal limits all WNL   Cervix: Normal in appearance without lesions, no cervical motion tenderness to palpation  Uterus: Without enlargement, mobile, nontender to palpate  Bilateral adnexa:  without masses, nontender to palpate    Skin: Normal skin color and pigmentation    Psychiatric: Alert and oriented x3. Affect normal to patient baseline.  Mood: appropriate       Assessment/Plan   Diagnoses and all orders for this visit:  Well woman exam with routine gynecological exam  -     THINPREP PAP  -     XR DEXA bone density; Future  Screening mammogram for breast cancer  -     BI mammo bilateral screening tomosynthesis; Future  Atrophic vaginitis  Vaginal dryness  -     estradiol (Vagifem) 10 mcg tablet vaginal tablet; insert 1 TABLET vaginally twice weekly at night  Cervical cancer screening  -     THINPREP PAP  Screening for HPV (human papillomavirus)  -     THINPREP PAP  Encounter for screening for osteoporosis  -     XR DEXA bone density; Future  Controlled well with vaginal estrogen     No follow-ups on file.     Pap plan: WNL HX, cotesting today  Discussed DEXA , pt is agreeable  Up to date with colonoscopy. WNL per pt.   RTO 1 year annual exam, prn   Mammogram screening evaluation     Lizzeth Mendoza, APRN-CNM, APRN-CNP

## 2024-12-04 ENCOUNTER — APPOINTMENT (OUTPATIENT)
Dept: PRIMARY CARE | Facility: CLINIC | Age: 64
End: 2024-12-04
Payer: COMMERCIAL

## 2024-12-04 VITALS
HEART RATE: 65 BPM | OXYGEN SATURATION: 97 % | DIASTOLIC BLOOD PRESSURE: 88 MMHG | TEMPERATURE: 96.9 F | BODY MASS INDEX: 24.9 KG/M2 | SYSTOLIC BLOOD PRESSURE: 150 MMHG | WEIGHT: 146.2 LBS

## 2024-12-04 DIAGNOSIS — K21.00 GASTROESOPHAGEAL REFLUX DISEASE WITH ESOPHAGITIS WITHOUT HEMORRHAGE: ICD-10-CM

## 2024-12-04 DIAGNOSIS — J32.9 SINUSITIS, UNSPECIFIED CHRONICITY, UNSPECIFIED LOCATION: ICD-10-CM

## 2024-12-04 DIAGNOSIS — I10 ESSENTIAL HYPERTENSION: ICD-10-CM

## 2024-12-04 DIAGNOSIS — R21 RASH AND NONSPECIFIC SKIN ERUPTION: Primary | ICD-10-CM

## 2024-12-04 DIAGNOSIS — F32.A DEPRESSIVE DISORDER: ICD-10-CM

## 2024-12-04 PROCEDURE — 3077F SYST BP >= 140 MM HG: CPT | Performed by: FAMILY MEDICINE

## 2024-12-04 PROCEDURE — 1036F TOBACCO NON-USER: CPT | Performed by: FAMILY MEDICINE

## 2024-12-04 PROCEDURE — 99214 OFFICE O/P EST MOD 30 MIN: CPT | Performed by: FAMILY MEDICINE

## 2024-12-04 PROCEDURE — 3079F DIAST BP 80-89 MM HG: CPT | Performed by: FAMILY MEDICINE

## 2024-12-04 PROCEDURE — RXMED WILLOW AMBULATORY MEDICATION CHARGE

## 2024-12-04 PROCEDURE — 1159F MED LIST DOCD IN RCRD: CPT | Performed by: FAMILY MEDICINE

## 2024-12-04 RX ORDER — OMEPRAZOLE 40 MG/1
40 CAPSULE, DELAYED RELEASE ORAL
Qty: 90 CAPSULE | Refills: 1 | Status: SHIPPED | OUTPATIENT
Start: 2024-12-04 | End: 2025-06-02

## 2024-12-04 RX ORDER — TRIAMCINOLONE ACETONIDE 1 MG/G
CREAM TOPICAL 2 TIMES DAILY
Qty: 15 G | Refills: 0 | Status: SHIPPED | OUTPATIENT
Start: 2024-12-04

## 2024-12-04 RX ORDER — TRAZODONE HYDROCHLORIDE 50 MG/1
50 TABLET ORAL NIGHTLY
Qty: 90 TABLET | Refills: 3 | Status: SHIPPED | OUTPATIENT
Start: 2024-12-04 | End: 2025-12-04

## 2024-12-04 RX ORDER — BUPROPION HYDROCHLORIDE 300 MG/1
300 TABLET ORAL
Qty: 90 TABLET | Refills: 1 | Status: SHIPPED | OUTPATIENT
Start: 2024-12-04 | End: 2025-06-02

## 2024-12-04 RX ORDER — DOXYCYCLINE 100 MG/1
100 CAPSULE ORAL 2 TIMES DAILY
Qty: 14 CAPSULE | Refills: 0 | Status: SHIPPED | OUTPATIENT
Start: 2024-12-04 | End: 2024-12-12

## 2024-12-04 ASSESSMENT — PATIENT HEALTH QUESTIONNAIRE - PHQ9
10. IF YOU CHECKED OFF ANY PROBLEMS, HOW DIFFICULT HAVE THESE PROBLEMS MADE IT FOR YOU TO DO YOUR WORK, TAKE CARE OF THINGS AT HOME, OR GET ALONG WITH OTHER PEOPLE: NOT DIFFICULT AT ALL
2. FEELING DOWN, DEPRESSED OR HOPELESS: SEVERAL DAYS
SUM OF ALL RESPONSES TO PHQ9 QUESTIONS 1 AND 2: 2
1. LITTLE INTEREST OR PLEASURE IN DOING THINGS: SEVERAL DAYS

## 2024-12-04 ASSESSMENT — ENCOUNTER SYMPTOMS
HYPERTENSION: 1
DEPRESSION: 1

## 2024-12-04 NOTE — PROGRESS NOTES
Subjective   Patient ID: Cami Jamison is a 64 y.o. female who presents for GERD, Hypertension (Recheck, no blood work), and Rash (Left arm, itchy and red, has had for months).  Asthma  Her past medical history is significant for asthma.   Depression  Patient has a history of: asthma    Hypertension      Rash: itchy rash on arm x months    2.  COPD:  feeling somewhat winded at times.  Not taking Encrus    3. Depression:  Mood is better.  Working 2nd shift now.    4. Sinusitis: has been having PND and seems to be getting an upset stomach for the past several weeks.      Patient Active Problem List   Diagnosis    Asthma without status asthmaticus ([facility])    Chronic obstructive lung disease (Multi)    Depressive disorder    Essential hypertension    Gastroesophageal reflux disease    Insomnia    Seasonal allergies    Chronic cough    Mass of uterine adnexa       Social Connections: Not on file       Current Outpatient Medications on File Prior to Visit   Medication Sig Dispense Refill    albuterol 90 mcg/actuation inhaler Inhale.      Comirnaty 2023-24, 12y up,,PF, 30 mcg/0.3 mL suspension       cyanocobalamin (Vitamin B-12) 250 mcg tablet Take 1 tablet (250 mcg) by mouth once daily.      estradiol (Vagifem) 10 mcg tablet vaginal tablet insert 1 TABLET vaginally twice weekly at night 24 tablet 3    ferrous sulfate 325 (65 Fe) MG tablet Take by mouth.      Flucelvax Quad [id number], PF, 60 mcg (15 mcg x 4)/0.5 mL syringe syringe       Incruse Ellipta 62.5 mcg/actuation inhalation INHALE 1 PUFF BY MOUTH ONCE DAILY 90 each 3    [DISCONTINUED] buPROPion XL (Wellbutrin XL) 300 mg 24 hr tablet TAKE 1 TABLET BY MOUTH EVERY MORNING 90 tablet 1    [DISCONTINUED] omeprazole (PriLOSEC) 40 mg DR capsule TAKE 1 CAPSULE BY MOUTH ONCE DAILY IN THE MORNING BEFORE A MEAL 90 capsule 1    [DISCONTINUED] traZODone (Desyrel) 50 mg tablet TAKE 1 TABLET BY MOUTH ONCE DAILY AT BEDTIME 90 tablet 3     No current facility-administered  medications on file prior to visit.        Vitals:    12/04/24 1429   BP: 150/88   Pulse: 65   Temp: 36.1 °C (96.9 °F)   SpO2: 97%     Vitals:    12/04/24 1429   Weight: 66.3 kg (146 lb 3.2 oz)       Review of Systems   All other systems reviewed and are negative.      Objective     Physical Exam  Vitals reviewed.   Constitutional:       General: She is not in acute distress.     Appearance: Normal appearance. She is well-developed. She is not diaphoretic.   HENT:      Head: Normocephalic and atraumatic.      Right Ear: Tympanic membrane normal.      Left Ear: Tympanic membrane normal.      Nose: Nose normal.      Mouth/Throat:      Mouth: Mucous membranes are moist.   Eyes:      Pupils: Pupils are equal, round, and reactive to light.   Cardiovascular:      Rate and Rhythm: Normal rate and regular rhythm.      Heart sounds: Normal heart sounds. No murmur heard.     No friction rub. No gallop.   Pulmonary:      Effort: Pulmonary effort is normal.      Breath sounds: Decreased air movement present. No rales.   Abdominal:      General: Bowel sounds are normal.      Palpations: Abdomen is soft.      Tenderness: There is no abdominal tenderness.   Musculoskeletal:      Cervical back: Normal range of motion and neck supple.   Skin:     General: Skin is warm and dry.   Neurological:      Mental Status: She is alert.   Psychiatric:         Mood and Affect: Mood normal.         No visits with results within 2 Month(s) from this visit.   Latest known visit with results is:   Lab on 05/29/2024   Component Date Value Ref Range Status    WBC 05/29/2024 8.1  4.4 - 11.3 x10*3/uL Final    nRBC 05/29/2024 0.0  0.0 - 0.0 /100 WBCs Final    RBC 05/29/2024 4.05  4.00 - 5.20 x10*6/uL Final    Hemoglobin 05/29/2024 13.0  12.0 - 16.0 g/dL Final    Hematocrit 05/29/2024 40.0  36.0 - 46.0 % Final    MCV 05/29/2024 99  80 - 100 fL Final    MCH 05/29/2024 32.1  26.0 - 34.0 pg Final    MCHC 05/29/2024 32.5  32.0 - 36.0 g/dL Final    RDW  05/29/2024 12.5  11.5 - 14.5 % Final    Platelets 05/29/2024 277  150 - 450 x10*3/uL Final    Neutrophils % 05/29/2024 46.0  40.0 - 80.0 % Final    Immature Granulocytes %, Automated 05/29/2024 0.2  0.0 - 0.9 % Final    Immature Granulocyte Count (IG) includes promyelocytes, myelocytes and metamyelocytes but does not include bands. Percent differential counts (%) should be interpreted in the context of the absolute cell counts (cells/UL).    Lymphocytes % 05/29/2024 38.2  13.0 - 44.0 % Final    Monocytes % 05/29/2024 10.0  2.0 - 10.0 % Final    Eosinophils % 05/29/2024 4.7  0.0 - 6.0 % Final    Basophils % 05/29/2024 0.9  0.0 - 2.0 % Final    Neutrophils Absolute 05/29/2024 3.72  1.20 - 7.70 x10*3/uL Final    Percent differential counts (%) should be interpreted in the context of the absolute cell counts (cells/uL).    Immature Granulocytes Absolute, Au* 05/29/2024 0.02  0.00 - 0.70 x10*3/uL Final    Lymphocytes Absolute 05/29/2024 3.09  1.20 - 4.80 x10*3/uL Final    Monocytes Absolute 05/29/2024 0.81  0.10 - 1.00 x10*3/uL Final    Eosinophils Absolute 05/29/2024 0.38  0.00 - 0.70 x10*3/uL Final    Basophils Absolute 05/29/2024 0.07  0.00 - 0.10 x10*3/uL Final    Glucose 05/29/2024 89  74 - 99 mg/dL Final    Sodium 05/29/2024 141  136 - 145 mmol/L Final    Potassium 05/29/2024 4.1  3.5 - 5.3 mmol/L Final    Chloride 05/29/2024 103  98 - 107 mmol/L Final    Bicarbonate 05/29/2024 31  21 - 32 mmol/L Final    Anion Gap 05/29/2024 11  10 - 20 mmol/L Final    Urea Nitrogen 05/29/2024 14  6 - 23 mg/dL Final    Creatinine 05/29/2024 0.86  0.50 - 1.05 mg/dL Final    eGFR 05/29/2024 76  >60 mL/min/1.73m*2 Final    Calculations of estimated GFR are performed using the 2021 CKD-EPI Study Refit equation without the race variable for the IDMS-Traceable creatinine methods.  https://jasn.asnjournals.org/content/early/2021/09/22/ASN.5519679887    Calcium 05/29/2024 9.6  8.6 - 10.3 mg/dL Final    Albumin 05/29/2024 4.4  3.4 - 5.0  g/dL Final    Alkaline Phosphatase 05/29/2024 60  33 - 136 U/L Final    Total Protein 05/29/2024 6.4  6.4 - 8.2 g/dL Final    AST 05/29/2024 18  9 - 39 U/L Final    Bilirubin, Total 05/29/2024 0.3  0.0 - 1.2 mg/dL Final    ALT 05/29/2024 20  7 - 45 U/L Final    Patients treated with Sulfasalazine may generate falsely decreased results for ALT.    Cholesterol 05/29/2024 205 (H)  0 - 199 mg/dL Final          Age      Desirable   Borderline High   High     0-19 Y     0 - 169       170 - 199     >/= 200    20-24 Y     0 - 189       190 - 224     >/= 225         >24 Y     0 - 199       200 - 239     >/= 240   **All ranges are based on fasting samples. Specific   therapeutic targets will vary based on patient-specific   cardiac risk.    Pediatric guidelines reference:Pediatrics 2011, 128(S5).Adult guidelines reference: NCEP ATPIII Guidelines,LYNSEY 2001, 258:2486-97    Venipuncture immediately after or during the administration of Metamizole may lead to falsely low results. Testing should be performed immediately prior to Metamizole dosing.    HDL-Cholesterol 05/29/2024 72.3  mg/dL Final      Age       Very Low   Low     Normal    High    0-19 Y    < 35      < 40     40-45     ----  20-24 Y    ----     < 40      >45      ----        >24 Y      ----     < 40     40-60      >60      Cholesterol/HDL Ratio 05/29/2024 2.8   Final      Ref Values  Desirable  < 3.4  High Risk  > 5.0    LDL Calculated 05/29/2024 113 (H)  <=99 mg/dL Final                                Near   Borderline      AGE      Desirable  Optimal    High     High     Very High     0-19 Y     0 - 109     ---    110-129   >/= 130     ----    20-24 Y     0 - 119     ---    120-159   >/= 160     ----      >24 Y     0 -  99   100-129  130-159   160-189     >/=190      VLDL 05/29/2024 19  0 - 40 mg/dL Final    Triglycerides 05/29/2024 97  0 - 149 mg/dL Final       Age         Desirable   Borderline High   High     Very High   0 D-90 D    19 - 174         ----          ----        ----  91 D- 9 Y     0 -  74        75 -  99     >/= 100      ----    10-19 Y     0 -  89        90 - 129     >/= 130      ----    20-24 Y     0 - 114       115 - 149     >/= 150      ----         >24 Y     0 - 149       150 - 199    200- 499    >/= 500    Venipuncture immediately after or during the administration of Metamizole may lead to falsely low results. Testing should be performed immediately prior to Metamizole dosing.    Non HDL Cholesterol 05/29/2024 133  0 - 149 mg/dL Final          Age       Desirable   Borderline High   High     Very High     0-19 Y     0 - 119       120 - 144     >/= 145    >/= 160    20-24 Y     0 - 149       150 - 189     >/= 190      ----         >24 Y    30 mg/dL above LDL Cholesterol goal         Assessment/Plan   Problem List Items Addressed This Visit             ICD-10-CM    Depressive disorder F32.A    Relevant Medications    buPROPion XL (Wellbutrin XL) 300 mg 24 hr tablet    traZODone (Desyrel) 50 mg tablet    Essential hypertension I10    Gastroesophageal reflux disease K21.9    Relevant Medications    omeprazole (PriLOSEC) 40 mg DR capsule     Other Visit Diagnoses         Codes    Rash and nonspecific skin eruption    -  Primary R21    Relevant Medications    triamcinolone (Kenalog) 0.1 % cream    Sinusitis, unspecified chronicity, unspecified location     J32.9    Relevant Medications    doxycycline (Vibramycin) 100 mg capsule          Refilled pts meds.  Doxy x 7 days.  Checking BP at home can call if running high.  Topical steroid for rash.  Fu in 6 mo

## 2024-12-05 ENCOUNTER — PHARMACY VISIT (OUTPATIENT)
Dept: PHARMACY | Facility: CLINIC | Age: 64
End: 2024-12-05
Payer: COMMERCIAL

## 2024-12-11 ENCOUNTER — HOSPITAL ENCOUNTER (OUTPATIENT)
Dept: RADIOLOGY | Facility: CLINIC | Age: 64
Discharge: HOME | End: 2024-12-11
Payer: COMMERCIAL

## 2024-12-11 DIAGNOSIS — Z13.820 ENCOUNTER FOR SCREENING FOR OSTEOPOROSIS: ICD-10-CM

## 2024-12-11 DIAGNOSIS — Z01.419 WELL WOMAN EXAM WITH ROUTINE GYNECOLOGICAL EXAM: ICD-10-CM

## 2024-12-11 PROCEDURE — 77080 DXA BONE DENSITY AXIAL: CPT

## 2024-12-11 PROCEDURE — 77080 DXA BONE DENSITY AXIAL: CPT | Performed by: RADIOLOGY

## 2024-12-12 LAB
CYTOLOGY CMNT CVX/VAG CYTO-IMP: NORMAL
HPV HR 12 DNA GENITAL QL NAA+PROBE: NEGATIVE
HPV HR GENOTYPES PNL CVX NAA+PROBE: NEGATIVE
HPV16 DNA SPEC QL NAA+PROBE: NEGATIVE
HPV18 DNA SPEC QL NAA+PROBE: NEGATIVE
LAB AP HPV GENOTYPE QUESTION: YES
LAB AP HPV HR: NORMAL
LABORATORY COMMENT REPORT: NORMAL
PATH REPORT.TOTAL CANCER: NORMAL

## 2024-12-19 ENCOUNTER — PHARMACY VISIT (OUTPATIENT)
Dept: PHARMACY | Facility: CLINIC | Age: 64
End: 2024-12-19
Payer: COMMERCIAL

## 2024-12-19 ENCOUNTER — HOSPITAL ENCOUNTER (OUTPATIENT)
Dept: RADIOLOGY | Facility: HOSPITAL | Age: 64
Discharge: HOME | End: 2024-12-19
Payer: COMMERCIAL

## 2024-12-19 DIAGNOSIS — Z12.31 SCREENING MAMMOGRAM FOR BREAST CANCER: ICD-10-CM

## 2024-12-19 PROCEDURE — RXOTC WILLOW AMBULATORY OTC CHARGE

## 2024-12-19 PROCEDURE — RXMED WILLOW AMBULATORY MEDICATION CHARGE

## 2024-12-21 ENCOUNTER — HOSPITAL ENCOUNTER (OUTPATIENT)
Dept: RADIOLOGY | Facility: HOSPITAL | Age: 64
Discharge: HOME | End: 2024-12-21
Payer: COMMERCIAL

## 2024-12-21 ENCOUNTER — APPOINTMENT (OUTPATIENT)
Dept: RADIOLOGY | Facility: HOSPITAL | Age: 64
End: 2024-12-21
Payer: COMMERCIAL

## 2024-12-21 VITALS — HEIGHT: 66 IN | WEIGHT: 144 LBS | BODY MASS INDEX: 23.14 KG/M2

## 2024-12-21 PROCEDURE — 77067 SCR MAMMO BI INCL CAD: CPT

## 2024-12-21 PROCEDURE — 77063 BREAST TOMOSYNTHESIS BI: CPT

## 2024-12-24 ENCOUNTER — TELEPHONE (OUTPATIENT)
Dept: OBSTETRICS AND GYNECOLOGY | Facility: CLINIC | Age: 64
End: 2024-12-24
Payer: COMMERCIAL

## 2024-12-24 NOTE — TELEPHONE ENCOUNTER
Reviewed results and recommendations with pt. She states understanding and appreciates the offer for additional testing d/t dense breast tissue and risk of missing small masses. Pt will continue self breast exams and continue annual breast exams and mammograms and declines breast MRI for now.

## 2024-12-24 NOTE — TELEPHONE ENCOUNTER
----- Message from Lizzeth Mendoza sent at 12/23/2024  1:25 PM EST -----      Pt's  mammogram reported as normal.  However, after review of your mammogram there is a presentation of dense breast tissue.   When there is dense breast tissue mammogram can miss small changes in the breast associated with breast cancer.   Additional imaging like bilateral breast MRI is offered at .   If you desire to proceed with the imaging. The bilateral breast MRI screening order can be placed.   DX: Dense breast tissue on Mammogram    Sometimes insurances will pay for these images, other times they will not. After the order is placed you can speak to radiology department about the cost and coverage.   Please be encouraged to complete self breast exams and return to the office as needed with any breast concerns.    Routinely we recommend yearly mammograms as a standard of care.   Please never hesitate to reach out if you have further questions. You can ask to speak to a nurse to have the order placed of have questions answered.     Thanks so much,    Sherita

## 2025-02-05 ENCOUNTER — APPOINTMENT (OUTPATIENT)
Facility: CLINIC | Age: 65
End: 2025-02-05
Payer: COMMERCIAL

## 2025-02-05 VITALS
BODY MASS INDEX: 23.95 KG/M2 | HEIGHT: 66 IN | DIASTOLIC BLOOD PRESSURE: 78 MMHG | SYSTOLIC BLOOD PRESSURE: 151 MMHG | WEIGHT: 149 LBS | HEART RATE: 58 BPM

## 2025-02-05 DIAGNOSIS — K21.9 GASTROESOPHAGEAL REFLUX DISEASE, UNSPECIFIED WHETHER ESOPHAGITIS PRESENT: ICD-10-CM

## 2025-02-05 DIAGNOSIS — K58.1 IRRITABLE BOWEL SYNDROME WITH CONSTIPATION: Primary | ICD-10-CM

## 2025-02-05 PROCEDURE — 3078F DIAST BP <80 MM HG: CPT | Performed by: INTERNAL MEDICINE

## 2025-02-05 PROCEDURE — 3008F BODY MASS INDEX DOCD: CPT | Performed by: INTERNAL MEDICINE

## 2025-02-05 PROCEDURE — 1159F MED LIST DOCD IN RCRD: CPT | Performed by: INTERNAL MEDICINE

## 2025-02-05 PROCEDURE — 3077F SYST BP >= 140 MM HG: CPT | Performed by: INTERNAL MEDICINE

## 2025-02-05 PROCEDURE — 1160F RVW MEDS BY RX/DR IN RCRD: CPT | Performed by: INTERNAL MEDICINE

## 2025-02-05 PROCEDURE — 99204 OFFICE O/P NEW MOD 45 MIN: CPT | Performed by: INTERNAL MEDICINE

## 2025-02-05 RX ORDER — FLUTICASONE PROPIONATE 50 MCG
1 SPRAY, SUSPENSION (ML) NASAL DAILY
COMMUNITY

## 2025-02-05 RX ORDER — FEXOFENADINE HCL 60 MG/1
60 TABLET, FILM COATED ORAL DAILY
COMMUNITY

## 2025-02-05 NOTE — ASSESSMENT & PLAN NOTE
Chronic longstanding symptoms from childhood and with a hiatus from symptoms initially following menopause but moderately symptomatic at this point.  We will give her a trial of Linzess 72 mcg daily and follow her response.  A long discussion was undertaken with the patient regarding the natural history of, differential diagnosis for, potential evaluation of, and management of functional gastrointestinal disorders including IBS C.  Reading material in this regard was discussed and distributed and questions answered to her satisfaction.  I spent approximately 40 minutes in face-to-face consultation with the patient in the coordination of our care plan.    Orders:    linaCLOtide (Linzess) 72 mcg capsule; Take 1 capsule (72 mcg) by mouth once daily in the morning. Take before meals. Do not crush or chew.

## 2025-02-05 NOTE — PROGRESS NOTES
[facility] Gastroenterology    ASSESSMENT and PLAN:       Cami Jamison is a 64 y.o. female with a significant past medical history of IBS-C, seen in follow up for colon cancer screening.  She has no personal or family history of colorectal neoplasm and thus should have routine screening colonoscopy every 10 years.  She would be due for colonoscopy in 2029.    Assessment & Plan  Irritable bowel syndrome with constipation  Chronic longstanding symptoms from childhood and with a hiatus from symptoms initially following menopause but moderately symptomatic at this point.  We will give her a trial of Linzess 72 mcg daily and follow her response.  A long discussion was undertaken with the patient regarding the natural history of, differential diagnosis for, potential evaluation of, and management of functional gastrointestinal disorders including IBS C.  Reading material in this regard was discussed and distributed and questions answered to her satisfaction.  I spent approximately 40 minutes in face-to-face consultation with the patient in the coordination of our care plan.    Orders:  •  linaCLOtide (Linzess) 72 mcg capsule; Take 1 capsule (72 mcg) by mouth once daily in the morning. Take before meals. Do not crush or chew.    Gastroesophageal reflux disease, unspecified whether esophagitis present  No typical pyrosis initially however has carried the clinical diagnosis of GERD and remains on a PPI with no overt foregut symptomatology.  This may be worth considering tapering off in the near future.           Benjamin Cruz MD    Gastroenterology  [facility]        Subjective   HISTORY OF PRESENT ILLNESS:     Chief Complaint  New Patient Visit (GERD/IBS - change in bowels/ colon done 2/14/19 with Dr. Gonzalez - EGD done - unsure when)    History Of Present Illness:    Cami Jamison is a 64 y.o. female with a significant past medical history of IBS-C  "and a clinical diagnosis of GERD.  Underwent colonoscopy in 2019 by general surgery.  No polyps found on this or previous colonoscopy.  No significant family history of colorectal neoplasm.  She was told to have a repeat surveillance colonoscopy in 3 years.  She recently has had a complaint of a decrease caliber in her stools.  She describes scybalous stools and infrequent bowel movements without a spontaneously passed bowel movement for 3 to 4 days followed by a \"blowout\".  Does not have mannie diarrhea, hematochezia, or melena.  Does admit to bloating and abdominal distention.  She has not been on any IBS specific medication previously.  She has tried fiber supplements which were not beneficial to her and she does complain of gas.  she follows with Rashaad Khalil MD as her PCP.           Review of systems:   Review of Systems  As per HPI  I performed a complete 10 point review of systems and it is negative except as noted in HPI or above.      PAST HISTORIES:       Past Medical History:  Patient Active Problem List   Diagnosis   • Asthma without status asthmaticus ([facility])   • Chronic obstructive lung disease (Multi)   • Depressive disorder   • Essential hypertension   • Gastroesophageal reflux disease   • Insomnia   • Seasonal allergies   • Chronic cough   • Mass of uterine adnexa   • Irritable bowel syndrome with constipation         Past Surgical History:  She has a past surgical history that includes Other surgical history (05/17/2022); Other surgical history (05/17/2022); Tubal ligation; and Toe Surgery (Right, 07/2023).      Social History:  She reports that she quit smoking about 5 years ago. Her smoking use included cigarettes. She has never used smokeless tobacco. She reports that she does not currently use alcohol. She reports that she does not use drugs.    Family History:  No known family history of GI disease, specifically denies any family history of pancreatitis, Crohn's, colon cancer, " "gastroesophageal cancer, or ulcerative colitis.    Family History   Problem Relation Name Age of Onset   • COPD Mother     • Heart failure Mother     • Colon cancer Mother     • Other (gangrene) Father          Allergies:  Sulfa (sulfonamide antibiotics), Amoxicillin-pot clavulanate, and Nitrofurantoin monohyd/m-cryst      Objective   OBJECTIVE:       Last Recorded Vitals:  Vitals:    02/05/25 1111   BP: 151/78   Pulse: 58   Weight: 67.6 kg (149 lb)   Height: 1.676 m (5' 6\")     /78   Pulse 58   Ht 1.676 m (5' 6\")   Wt 67.6 kg (149 lb)   BMI 24.05 kg/m²      Physical Exam:    Physical Exam  Physical Exam:  Pleasant female in no apparent distress  Alert and oriented x 3 NAD  HEENT: Normocephalic atraumatic.  Extraocular movements intact.  No scleral icterus.  Oropharynx slightly dry.  Clear no exudate.  Neck: Supple, full range of movement  CV: RRR, no murmurs appreciated  Lungs: CTA bilaterally  Abd: soft, obese, normal active bowel sounds, subjectively diffusely tender to palpation.  Ext: no lower extremity edema, cyanosis or clubbing  Skin: No jaundice or rashes     Home Medications:  Prior to Admission medications    Medication Sig Start Date End Date Taking? Authorizing Provider   albuterol 90 mcg/actuation inhaler Inhale. 8/4/21   Historical Provider, MD   buPROPion XL (Wellbutrin XL) 300 mg 24 hr tablet TAKE 1 TABLET BY MOUTH EVERY MORNING 12/4/24 6/2/25  Rashaad Khalil MD   calcium citrate-vitamin D2 250 mg-2.5 mcg (100 unit) tablet Take 1 tablet by mouth 2 times a day.    Historical Provider, MD   cyanocobalamin (Vitamin B-12) 250 mcg tablet Take 1 tablet (250 mcg) by mouth once daily.    Historical Provider, MD   estradiol (Vagifem) 10 mcg tablet vaginal tablet insert 1 TABLET vaginally twice weekly at night 11/26/24 11/26/25  TINO Manning-CNM, APRRITA-CNP   ferrous sulfate 325 (65 Fe) MG tablet Take by mouth.    Historical Provider, MD   fexofenadine (Allegra) 60 mg tablet Take 1 tablet " (60 mg) by mouth once daily.    Historical Provider, MD   Flucelvax Quad 4239-4670, PF, 60 mcg (15 mcg x 4)/0.5 mL syringe syringe  12/8/23   Historical Provider, MD   fluticasone (Flonase) 50 mcg/actuation nasal spray Administer 1 spray into each nostril once daily. Shake gently. Before first use, prime pump. After use, clean tip and replace cap.    Historical Provider, MD   Incruse Ellipta 62.5 mcg/actuation inhalation INHALE 1 PUFF BY MOUTH ONCE DAILY 6/4/24 6/4/25  Rashaad Khalil MD   linaCLOtide (Linzess) 72 mcg capsule Take 1 capsule (72 mcg) by mouth once daily in the morning. Take before meals. Do not crush or chew. 2/5/25 2/5/26  Benjamin Cruz MD   omeprazole (PriLOSEC) 40 mg DR capsule TAKE 1 CAPSULE BY MOUTH ONCE DAILY IN THE MORNING BEFORE A MEAL 12/4/24 6/2/25  Rashaad Khalil MD   traZODone (Desyrel) 50 mg tablet TAKE 1 TABLET BY MOUTH ONCE DAILY AT BEDTIME 12/4/24 12/4/25  Rashaad Khalil MD   triamcinolone (Kenalog) 0.1 % cream Apply topically 2 times a day. Apply to affected area 1-2 times daily as needed. 12/4/24   Rashaad Khalil MD   valsartan (Diovan) 160 mg tablet Take 1 tablet (160 mg) by mouth once daily. 12/19/24 6/17/25  Rashaad Khalil MD   Comirnaty 2023-24, 12y up,,PF, 30 mcg/0.3 mL suspension  12/8/23 2/5/25  Historical Provider, MD         Relevant Results Recent labs reviewed in the EMR.    Lab Results   Component Value Date/Time    WBC 8.1 05/29/2024 1148    HGB 13.0 05/29/2024 1148    HGB 12.4 10/12/2022 1040    HGB 12.5 06/21/2022 1112    HGB 11.7 (L) 08/18/2021 1543    MCV 99 05/29/2024 1148     05/29/2024 1148     10/12/2022 1040     06/21/2022 1112     08/18/2021 1543    IRON 94 08/18/2021 1543    TIBC 348 08/18/2021 1543    IRONSAT 27 08/18/2021 1543    FERRITIN 115 08/18/2021 1543    BTQCUHAB91 413 08/18/2021 1543    FOLATE 19.4 08/18/2021 1543       Lab Results   Component Value Date/Time     05/29/2024 1148    K 4.1 05/29/2024 1148      05/29/2024 1148    BUN 14 05/29/2024 1148    CREATININE 0.86 05/29/2024 1148    CREATININE 0.84 10/12/2022 1040    CREATININE 0.87 06/21/2022 1112       Lab Results   Component Value Date/Time    BILITOT 0.3 05/29/2024 1148    BILITOT 0.4 10/12/2022 1040    BILITOT 0.4 06/21/2022 1112    BILITOT 0.3 06/24/2021 1415    ALKPHOS 60 05/29/2024 1148    ALKPHOS 48 10/12/2022 1040    ALKPHOS 49 06/21/2022 1112    ALKPHOS 50 06/24/2021 1415    AST 18 05/29/2024 1148    AST 16 10/12/2022 1040    AST 18 06/21/2022 1112    AST 14 06/24/2021 1415    ALT 20 05/29/2024 1148    ALT 16 10/12/2022 1040    ALT 18 06/21/2022 1112    ALT 14 06/24/2021 1415    LIPASE 26 10/12/2022 1040         Radiology: Imaging reviewed in the EMR.  No results found.

## 2025-02-05 NOTE — ASSESSMENT & PLAN NOTE
No typical pyrosis initially however has carried the clinical diagnosis of GERD and remains on a PPI with no overt foregut symptomatology.  This may be worth considering tapering off in the near future.

## 2025-02-06 ENCOUNTER — TELEPHONE (OUTPATIENT)
Facility: CLINIC | Age: 65
End: 2025-02-06
Payer: COMMERCIAL

## 2025-02-06 DIAGNOSIS — K58.1 IRRITABLE BOWEL SYNDROME WITH CONSTIPATION: Primary | ICD-10-CM

## 2025-02-06 RX ORDER — LUBIPROSTONE 8 UG/1
8 CAPSULE ORAL
Qty: 60 CAPSULE | Refills: 11 | Status: SHIPPED | OUTPATIENT
Start: 2025-02-06 | End: 2025-02-07 | Stop reason: SDUPTHER

## 2025-02-06 NOTE — TELEPHONE ENCOUNTER
Received Linzess denial from [facility] via fax. Insurance is requesting she try Amitiza or Trulance.  Appeal form has been placed on provider's desk

## 2025-02-07 DIAGNOSIS — K58.1 IRRITABLE BOWEL SYNDROME WITH CONSTIPATION: ICD-10-CM

## 2025-02-07 PROCEDURE — RXMED WILLOW AMBULATORY MEDICATION CHARGE

## 2025-02-07 RX ORDER — LUBIPROSTONE 8 UG/1
8 CAPSULE ORAL
Qty: 60 CAPSULE | Refills: 11 | Status: SHIPPED | OUTPATIENT
Start: 2025-02-07 | End: 2026-02-07

## 2025-02-07 NOTE — TELEPHONE ENCOUNTER
Yes, I can see you sent in Amitiza.  I have scanned document to chart.  This completes this task. Thank you.

## 2025-02-10 ENCOUNTER — PHARMACY VISIT (OUTPATIENT)
Dept: PHARMACY | Facility: CLINIC | Age: 65
End: 2025-02-10
Payer: COMMERCIAL

## 2025-03-12 PROCEDURE — RXMED WILLOW AMBULATORY MEDICATION CHARGE

## 2025-03-17 ENCOUNTER — PHARMACY VISIT (OUTPATIENT)
Dept: PHARMACY | Facility: CLINIC | Age: 65
End: 2025-03-17
Payer: COMMERCIAL

## 2025-03-17 PROCEDURE — RXMED WILLOW AMBULATORY MEDICATION CHARGE

## 2025-03-21 ENCOUNTER — PHARMACY VISIT (OUTPATIENT)
Dept: PHARMACY | Facility: CLINIC | Age: 65
End: 2025-03-21
Payer: COMMERCIAL

## 2025-04-17 PROCEDURE — RXMED WILLOW AMBULATORY MEDICATION CHARGE

## 2025-04-18 ENCOUNTER — PHARMACY VISIT (OUTPATIENT)
Dept: PHARMACY | Facility: CLINIC | Age: 65
End: 2025-04-18
Payer: COMMERCIAL

## 2025-05-20 PROCEDURE — RXMED WILLOW AMBULATORY MEDICATION CHARGE

## 2025-05-24 ENCOUNTER — PHARMACY VISIT (OUTPATIENT)
Dept: PHARMACY | Facility: CLINIC | Age: 65
End: 2025-05-24
Payer: COMMERCIAL

## 2025-05-24 PROCEDURE — RXOTC WILLOW AMBULATORY OTC CHARGE

## 2025-06-13 DIAGNOSIS — I10 ESSENTIAL HYPERTENSION: ICD-10-CM

## 2025-06-13 PROCEDURE — RXMED WILLOW AMBULATORY MEDICATION CHARGE

## 2025-06-13 RX ORDER — VALSARTAN 160 MG/1
160 TABLET ORAL DAILY
Qty: 90 TABLET | Refills: 0 | Status: SHIPPED | OUTPATIENT
Start: 2025-06-13 | End: 2025-09-11

## 2025-06-13 NOTE — TELEPHONE ENCOUNTER
Pt called Rx line asking for a refill on pended medication, pt was due to follow up in 6 months after Dec OV. Pt needs OV before short supply. Thanks, AM

## 2025-06-16 ENCOUNTER — PHARMACY VISIT (OUTPATIENT)
Dept: PHARMACY | Facility: CLINIC | Age: 65
End: 2025-06-16
Payer: COMMERCIAL

## 2025-06-29 PROCEDURE — RXMED WILLOW AMBULATORY MEDICATION CHARGE

## 2025-06-30 ENCOUNTER — PHARMACY VISIT (OUTPATIENT)
Dept: PHARMACY | Facility: CLINIC | Age: 65
End: 2025-06-30
Payer: COMMERCIAL

## 2025-06-30 PROCEDURE — RXOTC WILLOW AMBULATORY OTC CHARGE

## 2025-07-02 ENCOUNTER — APPOINTMENT (OUTPATIENT)
Dept: PRIMARY CARE | Facility: CLINIC | Age: 65
End: 2025-07-02
Payer: COMMERCIAL

## 2025-07-02 VITALS
OXYGEN SATURATION: 99 % | SYSTOLIC BLOOD PRESSURE: 118 MMHG | HEART RATE: 69 BPM | BODY MASS INDEX: 24.53 KG/M2 | DIASTOLIC BLOOD PRESSURE: 76 MMHG | WEIGHT: 152 LBS | TEMPERATURE: 98.8 F

## 2025-07-02 DIAGNOSIS — K21.00 GASTROESOPHAGEAL REFLUX DISEASE WITH ESOPHAGITIS WITHOUT HEMORRHAGE: ICD-10-CM

## 2025-07-02 DIAGNOSIS — J45.20 MILD INTERMITTENT ASTHMA WITHOUT STATUS ASTHMATICUS WITHOUT COMPLICATION (HHS-HCC): ICD-10-CM

## 2025-07-02 DIAGNOSIS — J44.9 CHRONIC OBSTRUCTIVE PULMONARY DISEASE, UNSPECIFIED COPD TYPE (MULTI): ICD-10-CM

## 2025-07-02 DIAGNOSIS — Z00.00 ROUTINE ADULT HEALTH MAINTENANCE: Primary | ICD-10-CM

## 2025-07-02 DIAGNOSIS — I10 ESSENTIAL HYPERTENSION: ICD-10-CM

## 2025-07-02 DIAGNOSIS — F32.A DEPRESSIVE DISORDER: ICD-10-CM

## 2025-07-02 PROCEDURE — RXMED WILLOW AMBULATORY MEDICATION CHARGE

## 2025-07-02 PROCEDURE — 99214 OFFICE O/P EST MOD 30 MIN: CPT | Performed by: FAMILY MEDICINE

## 2025-07-02 PROCEDURE — 3074F SYST BP LT 130 MM HG: CPT | Performed by: FAMILY MEDICINE

## 2025-07-02 PROCEDURE — 1036F TOBACCO NON-USER: CPT | Performed by: FAMILY MEDICINE

## 2025-07-02 PROCEDURE — 1159F MED LIST DOCD IN RCRD: CPT | Performed by: FAMILY MEDICINE

## 2025-07-02 PROCEDURE — 3078F DIAST BP <80 MM HG: CPT | Performed by: FAMILY MEDICINE

## 2025-07-02 PROCEDURE — 99397 PER PM REEVAL EST PAT 65+ YR: CPT | Performed by: FAMILY MEDICINE

## 2025-07-02 RX ORDER — BUPROPION HYDROCHLORIDE 300 MG/1
300 TABLET ORAL
Qty: 90 TABLET | Refills: 1 | Status: SHIPPED | OUTPATIENT
Start: 2025-07-02 | End: 2025-12-29

## 2025-07-02 RX ORDER — UMECLIDINIUM 62.5 UG/1
1 AEROSOL, POWDER ORAL DAILY
Qty: 90 EACH | Refills: 3 | Status: SHIPPED | OUTPATIENT
Start: 2025-07-02 | End: 2026-07-02

## 2025-07-02 RX ORDER — ALBUTEROL SULFATE 90 UG/1
2 INHALANT RESPIRATORY (INHALATION) EVERY 6 HOURS PRN
Qty: 18 G | Refills: 2 | Status: SHIPPED | OUTPATIENT
Start: 2025-07-02

## 2025-07-02 RX ORDER — OMEPRAZOLE 40 MG/1
40 CAPSULE, DELAYED RELEASE ORAL
Qty: 90 CAPSULE | Refills: 1 | Status: SHIPPED | OUTPATIENT
Start: 2025-07-02 | End: 2025-12-29

## 2025-07-02 RX ORDER — VALSARTAN 160 MG/1
160 TABLET ORAL DAILY
Qty: 90 TABLET | Refills: 1 | Status: SHIPPED | OUTPATIENT
Start: 2025-07-02 | End: 2025-12-29

## 2025-07-02 NOTE — PROGRESS NOTES
Subjective   Patient ID: Cami Jamison is a 64 y.o. female who presents for Hypertension (Recheck ) and Insomnia.    HTN: well controlled    2.  COPD:  feeling somewhat winded at times.  Taking Encrus    3. Depression:  Mood is better.  Working 2nd shift now.    4. Cataracts: needs preop clearance.      Patient Active Problem List   Diagnosis    Asthma without status asthmaticus ([facility])    Chronic obstructive lung disease (Multi)    Depressive disorder    Essential hypertension    Gastroesophageal reflux disease    Insomnia    Seasonal allergies    Chronic cough    Mass of uterine adnexa    Irritable bowel syndrome with constipation       Social Connections: Not on file       Current Outpatient Medications on File Prior to Visit   Medication Sig Dispense Refill    calcium citrate-vitamin D2 250 mg-2.5 mcg (100 unit) tablet Take 1 tablet by mouth 2 times a day.      cyanocobalamin (Vitamin B-12) 250 mcg tablet Take 1 tablet (250 mcg) by mouth once daily.      estradiol (Vagifem) 10 mcg tablet vaginal tablet insert 1 TABLET vaginally twice weekly at night 24 tablet 3    ferrous sulfate 325 (65 Fe) MG tablet Take by mouth.      fexofenadine (Allegra) 60 mg tablet Take 1 tablet (60 mg) by mouth once daily.      fluticasone (Flonase) 50 mcg/actuation nasal spray Administer 1 spray into each nostril once daily. Shake gently. Before first use, prime pump. After use, clean tip and replace cap.      lubiprostone (Amitiza) 8 mcg capsule Take 1 capsule (8 mcg) by mouth 2 times daily (morning and late afternoon). Take with meals 60 capsule 11    traZODone (Desyrel) 50 mg tablet TAKE 1 TABLET BY MOUTH ONCE DAILY AT BEDTIME 90 tablet 3    triamcinolone (Kenalog) 0.1 % cream Apply topically 2 times a day. Apply to affected area 1-2 times daily as needed. 15 g 0    [DISCONTINUED] albuterol 90 mcg/actuation inhaler Inhale.      [DISCONTINUED] buPROPion XL (Wellbutrin XL) 300 mg 24 hr tablet TAKE 1 TABLET BY MOUTH EVERY  MORNING 90 tablet 1    [DISCONTINUED] Flucelvax Quad 6595-6199, PF, 60 mcg (15 mcg x 4)/0.5 mL syringe syringe       [DISCONTINUED] Incruse Ellipta 62.5 mcg/actuation inhalation INHALE 1 PUFF BY MOUTH ONCE DAILY 90 each 3    [DISCONTINUED] omeprazole (PriLOSEC) 40 mg DR capsule TAKE 1 CAPSULE BY MOUTH ONCE DAILY IN THE MORNING BEFORE A MEAL 90 capsule 1    [DISCONTINUED] valsartan (Diovan) 160 mg tablet Take 1 tablet (160 mg) by mouth once daily. 90 tablet 0     No current facility-administered medications on file prior to visit.        Vitals:    07/02/25 1316   BP: 118/76   Pulse: 69   Temp: 37.1 °C (98.8 °F)   SpO2: 99%     Vitals:    07/02/25 1316   Weight: 68.9 kg (152 lb)       Review of Systems   All other systems reviewed and are negative.      Objective     Physical Exam  Vitals reviewed.   Constitutional:       General: She is not in acute distress.     Appearance: Normal appearance. She is well-developed. She is not diaphoretic.   HENT:      Head: Normocephalic and atraumatic.      Right Ear: Tympanic membrane normal.      Left Ear: Tympanic membrane normal.      Nose: Nose normal.      Mouth/Throat:      Mouth: Mucous membranes are moist.   Eyes:      Pupils: Pupils are equal, round, and reactive to light.   Cardiovascular:      Rate and Rhythm: Normal rate and regular rhythm.      Heart sounds: Normal heart sounds. No murmur heard.     No friction rub. No gallop.   Pulmonary:      Effort: Pulmonary effort is normal.      Breath sounds: Decreased air movement present. No rales.   Abdominal:      General: Bowel sounds are normal.      Palpations: Abdomen is soft.      Tenderness: There is no abdominal tenderness.   Musculoskeletal:      Cervical back: Normal range of motion and neck supple.   Skin:     General: Skin is warm and dry.   Neurological:      Mental Status: She is alert.   Psychiatric:         Mood and Affect: Mood normal.         No visits with results within 2 Month(s) from this visit.    Latest known visit with results is:   Office Visit on 11/26/2024   Component Date Value Ref Range Status    Case Report 11/26/2024    Final                    Value:Gynecologic Cytology                              Case: [id number]                                   Authorizing Provider:  Lizzeth Mendoza,        Collected:           11/26/2024 1306                                     APRN-CNM, APRN-CNP                                                           Ordering Location:     [facility]  Received:            11/26/2024 1306              First Screen:          JEROME Davis                                                              Specimen:    ThinPrep Liquid-Based Pap-Imaging System Screen, CERVIX, SCREENING                         Final Cytological Interpretation 11/26/2024    Final                    Value:    A. THINPREP PAP CERVIX, SCREENING -     Specimen Adequacy  Satisfactory for evaluation; absence of endocervical/transformation zone component    General Categorization  Negative for intraepithelial lesion or malignancy.    Descriptive Interpretation  Negative for intraepithelial lesion or malignancy  Cellular changes consistent with atrophy              11/26/2024    Final                    Value:Slide(s) initially screened by JEROME Davis at [address] [id number]  By the signature on this report, the individual or group listed as making the Final Interpretation/Diagnosis certifies that they have reviewed this case.       ThinPrep Imaging System 11/26/2024    Final                    Value:This specimen has been analyzed by the ThinPrep Imaging System ([employer] an automated imaging and review system, which assists the laboratory in evaluating cells on ThinPrep Pap tests. Following automated imaging, selected fields from every slide were reviewed by a cytotechnologist and/or pathologist.        Educational Note 11/26/2024    Final                     Value:Cervical cytology is a screening procedure primarily for squamous cancers and precursors and has associated false-negative and false-positives results as evidenced by published data. Your patient's test should be interpreted in this context, together with the patient's history and clinical findings. Regular sampling and follow-up of unexplained clinical signs and symptoms are recommended to minimize false negative results.      Perform HPV HR test? 11/26/2024 Always (all interpretations)   Final    Include HPV Genotype? 11/26/2024 Yes   Final    HPV, high-risk 11/26/2024 Negative  Negative Final    HPV Type 16 DNA 11/26/2024 Negative  Negative Final    HPV Type 18 DNA 11/26/2024 Negative  Negative Final    HPV non-Type 16 or 18 DNA 11/26/2024 Negative  Negative Final       Assessment/Plan   Problem List Items Addressed This Visit           ICD-10-CM    Asthma without status asthmaticus ([facility]) J45.909    Relevant Medications    Incruse Ellipta 62.5 mcg/actuation inhalation    albuterol 90 mcg/actuation inhaler    Depressive disorder F32.A    Relevant Medications    buPROPion XL (Wellbutrin XL) 300 mg 24 hr tablet    Essential hypertension I10    Relevant Medications    valsartan (Diovan) 160 mg tablet    Gastroesophageal reflux disease K21.9    Relevant Medications    omeprazole (PriLOSEC) 40 mg DR capsule     Other Visit Diagnoses         Codes      Routine adult health maintenance    -  Primary Z00.00    Relevant Orders    Lipid Panel    Comprehensive Metabolic Panel    CBC and Auto Differential          Pt is clear for cataract surgery.  Doing well overall.   Refilled pts meds.  Fu in 6 mo

## 2025-07-03 LAB
ALBUMIN SERPL-MCNC: 4.6 G/DL (ref 3.6–5.1)
ALP SERPL-CCNC: 58 U/L (ref 37–153)
ALT SERPL-CCNC: 14 U/L (ref 6–29)
ANION GAP SERPL CALCULATED.4IONS-SCNC: 10 MMOL/L (CALC) (ref 7–17)
AST SERPL-CCNC: 16 U/L (ref 10–35)
BASOPHILS # BLD AUTO: 77 CELLS/UL (ref 0–200)
BASOPHILS NFR BLD AUTO: 1 %
BILIRUB SERPL-MCNC: 0.3 MG/DL (ref 0.2–1.2)
BUN SERPL-MCNC: 16 MG/DL (ref 7–25)
CALCIUM SERPL-MCNC: 9.9 MG/DL (ref 8.6–10.4)
CHLORIDE SERPL-SCNC: 103 MMOL/L (ref 98–110)
CHOLEST SERPL-MCNC: 219 MG/DL
CHOLEST/HDLC SERPL: 2.9 (CALC)
CO2 SERPL-SCNC: 27 MMOL/L (ref 20–32)
CREAT SERPL-MCNC: 0.72 MG/DL (ref 0.5–1.05)
EGFRCR SERPLBLD CKD-EPI 2021: 93 ML/MIN/1.73M2
EOSINOPHIL # BLD AUTO: 162 CELLS/UL (ref 15–500)
EOSINOPHIL NFR BLD AUTO: 2.1 %
ERYTHROCYTE [DISTWIDTH] IN BLOOD BY AUTOMATED COUNT: 13 % (ref 11–15)
GLUCOSE SERPL-MCNC: 78 MG/DL (ref 65–139)
HCT VFR BLD AUTO: 40.1 % (ref 35–45)
HDLC SERPL-MCNC: 75 MG/DL
HGB BLD-MCNC: 12.8 G/DL (ref 11.7–15.5)
LDLC SERPL CALC-MCNC: 116 MG/DL (CALC)
LYMPHOCYTES # BLD AUTO: 2341 CELLS/UL (ref 850–3900)
LYMPHOCYTES NFR BLD AUTO: 30.4 %
MCH RBC QN AUTO: 31.9 PG (ref 27–33)
MCHC RBC AUTO-ENTMCNC: 31.9 G/DL (ref 32–36)
MCV RBC AUTO: 100 FL (ref 80–100)
MONOCYTES # BLD AUTO: 655 CELLS/UL (ref 200–950)
MONOCYTES NFR BLD AUTO: 8.5 %
NEUTROPHILS # BLD AUTO: 4466 CELLS/UL (ref 1500–7800)
NEUTROPHILS NFR BLD AUTO: 58 %
NONHDLC SERPL-MCNC: 144 MG/DL (CALC)
PLATELET # BLD AUTO: 280 THOUSAND/UL (ref 140–400)
PMV BLD REES-ECKER: 10.5 FL (ref 7.5–12.5)
POTASSIUM SERPL-SCNC: 4.5 MMOL/L (ref 3.5–5.3)
PROT SERPL-MCNC: 6.7 G/DL (ref 6.1–8.1)
RBC # BLD AUTO: 4.01 MILLION/UL (ref 3.8–5.1)
SODIUM SERPL-SCNC: 140 MMOL/L (ref 135–146)
TRIGL SERPL-MCNC: 162 MG/DL
WBC # BLD AUTO: 7.7 THOUSAND/UL (ref 3.8–10.8)

## 2025-07-07 ENCOUNTER — PHARMACY VISIT (OUTPATIENT)
Dept: PHARMACY | Facility: CLINIC | Age: 65
End: 2025-07-07
Payer: COMMERCIAL

## 2025-08-03 PROCEDURE — RXMED WILLOW AMBULATORY MEDICATION CHARGE

## 2025-08-04 ENCOUNTER — PHARMACY VISIT (OUTPATIENT)
Dept: PHARMACY | Facility: CLINIC | Age: 65
End: 2025-08-04
Payer: COMMERCIAL

## 2025-08-26 DIAGNOSIS — N89.8 VAGINAL DRYNESS: ICD-10-CM

## 2025-08-26 RX ORDER — ESTRADIOL 10 UG/1
TABLET, FILM COATED VAGINAL
Qty: 24 TABLET | Refills: 2 | Status: SHIPPED | OUTPATIENT
Start: 2025-08-26 | End: 2026-08-26

## 2025-12-02 ENCOUNTER — APPOINTMENT (OUTPATIENT)
Dept: OBSTETRICS AND GYNECOLOGY | Facility: CLINIC | Age: 65
End: 2025-12-02
Payer: COMMERCIAL

## 2026-05-27 ENCOUNTER — APPOINTMENT (OUTPATIENT)
Dept: OBSTETRICS AND GYNECOLOGY | Facility: CLINIC | Age: 66
End: 2026-05-27
Payer: COMMERCIAL